# Patient Record
Sex: MALE | Race: WHITE | Employment: OTHER | URBAN - METROPOLITAN AREA
[De-identification: names, ages, dates, MRNs, and addresses within clinical notes are randomized per-mention and may not be internally consistent; named-entity substitution may affect disease eponyms.]

---

## 2018-05-09 RX ORDER — AMLODIPINE BESYLATE 2.5 MG/1
2.5 TABLET ORAL DAILY
COMMUNITY

## 2018-05-09 RX ORDER — KETOTIFEN FUMARATE 0.35 MG/ML
1 SOLUTION/ DROPS OPHTHALMIC AS NEEDED
COMMUNITY

## 2018-05-09 RX ORDER — OMEPRAZOLE 40 MG/1
40 CAPSULE, DELAYED RELEASE ORAL DAILY
COMMUNITY

## 2018-05-09 RX ORDER — ATORVASTATIN CALCIUM 40 MG/1
40 TABLET, FILM COATED ORAL DAILY
COMMUNITY
End: 2020-12-28

## 2018-05-11 ENCOUNTER — ANESTHESIA EVENT (OUTPATIENT)
Dept: ENDOSCOPY | Age: 68
End: 2018-05-11
Payer: MEDICARE

## 2018-05-11 ENCOUNTER — HOSPITAL ENCOUNTER (OUTPATIENT)
Age: 68
Setting detail: OUTPATIENT SURGERY
Discharge: HOME | End: 2018-05-11
Attending: SPECIALIST | Admitting: SPECIALIST
Payer: MEDICARE

## 2018-05-11 ENCOUNTER — ANESTHESIA (OUTPATIENT)
Dept: ENDOSCOPY | Age: 68
End: 2018-05-11
Payer: MEDICARE

## 2018-05-11 VITALS
OXYGEN SATURATION: 94 % | TEMPERATURE: 97.9 F | HEART RATE: 63 BPM | HEIGHT: 64 IN | BODY MASS INDEX: 30.92 KG/M2 | DIASTOLIC BLOOD PRESSURE: 67 MMHG | SYSTOLIC BLOOD PRESSURE: 123 MMHG | WEIGHT: 181.13 LBS | RESPIRATION RATE: 15 BRPM

## 2018-05-11 PROCEDURE — 76060000032 HC ANESTHESIA 0.5 TO 1 HR: Performed by: SPECIALIST

## 2018-05-11 PROCEDURE — 74011250636 HC RX REV CODE- 250/636

## 2018-05-11 PROCEDURE — 88305 TISSUE EXAM BY PATHOLOGIST: CPT | Performed by: SPECIALIST

## 2018-05-11 PROCEDURE — 74011250636 HC RX REV CODE- 250/636: Performed by: SPECIALIST

## 2018-05-11 PROCEDURE — 76040000007: Performed by: SPECIALIST

## 2018-05-11 PROCEDURE — 74011000250 HC RX REV CODE- 250

## 2018-05-11 RX ORDER — DEXTROMETHORPHAN/PSEUDOEPHED 2.5-7.5/.8
1.2 DROPS ORAL
Status: DISCONTINUED | OUTPATIENT
Start: 2018-05-11 | End: 2018-05-11 | Stop reason: HOSPADM

## 2018-05-11 RX ORDER — EPINEPHRINE 0.1 MG/ML
1 INJECTION INTRACARDIAC; INTRAVENOUS
Status: DISCONTINUED | OUTPATIENT
Start: 2018-05-11 | End: 2018-05-11 | Stop reason: HOSPADM

## 2018-05-11 RX ORDER — PROPOFOL 10 MG/ML
INJECTION, EMULSION INTRAVENOUS AS NEEDED
Status: DISCONTINUED | OUTPATIENT
Start: 2018-05-11 | End: 2018-05-11 | Stop reason: HOSPADM

## 2018-05-11 RX ORDER — FLUMAZENIL 0.1 MG/ML
0.2 INJECTION INTRAVENOUS
Status: DISCONTINUED | OUTPATIENT
Start: 2018-05-11 | End: 2018-05-11 | Stop reason: HOSPADM

## 2018-05-11 RX ORDER — GLYCOPYRROLATE 0.2 MG/ML
INJECTION INTRAMUSCULAR; INTRAVENOUS AS NEEDED
Status: DISCONTINUED | OUTPATIENT
Start: 2018-05-11 | End: 2018-05-11 | Stop reason: HOSPADM

## 2018-05-11 RX ORDER — NALOXONE HYDROCHLORIDE 0.4 MG/ML
0.4 INJECTION, SOLUTION INTRAMUSCULAR; INTRAVENOUS; SUBCUTANEOUS
Status: DISCONTINUED | OUTPATIENT
Start: 2018-05-11 | End: 2018-05-11 | Stop reason: HOSPADM

## 2018-05-11 RX ORDER — LIDOCAINE HYDROCHLORIDE 20 MG/ML
INJECTION, SOLUTION EPIDURAL; INFILTRATION; INTRACAUDAL; PERINEURAL AS NEEDED
Status: DISCONTINUED | OUTPATIENT
Start: 2018-05-11 | End: 2018-05-11 | Stop reason: HOSPADM

## 2018-05-11 RX ORDER — SODIUM CHLORIDE 9 MG/ML
50 INJECTION, SOLUTION INTRAVENOUS CONTINUOUS
Status: DISCONTINUED | OUTPATIENT
Start: 2018-05-11 | End: 2018-05-11 | Stop reason: HOSPADM

## 2018-05-11 RX ORDER — SODIUM CHLORIDE 0.9 % (FLUSH) 0.9 %
5-10 SYRINGE (ML) INJECTION EVERY 8 HOURS
Status: DISCONTINUED | OUTPATIENT
Start: 2018-05-11 | End: 2018-05-11 | Stop reason: HOSPADM

## 2018-05-11 RX ORDER — MIDAZOLAM HYDROCHLORIDE 1 MG/ML
1-3 INJECTION, SOLUTION INTRAMUSCULAR; INTRAVENOUS
Status: DISCONTINUED | OUTPATIENT
Start: 2018-05-11 | End: 2018-05-11 | Stop reason: HOSPADM

## 2018-05-11 RX ORDER — SODIUM CHLORIDE 0.9 % (FLUSH) 0.9 %
5-10 SYRINGE (ML) INJECTION AS NEEDED
Status: DISCONTINUED | OUTPATIENT
Start: 2018-05-11 | End: 2018-05-11 | Stop reason: HOSPADM

## 2018-05-11 RX ORDER — ATROPINE SULFATE 0.1 MG/ML
0.5 INJECTION INTRAVENOUS
Status: DISCONTINUED | OUTPATIENT
Start: 2018-05-11 | End: 2018-05-11 | Stop reason: HOSPADM

## 2018-05-11 RX ORDER — LISINOPRIL 10 MG/1
10 TABLET ORAL DAILY
COMMUNITY

## 2018-05-11 RX ORDER — PHENYLEPHRINE HCL IN 0.9% NACL 0.4MG/10ML
SYRINGE (ML) INTRAVENOUS AS NEEDED
Status: DISCONTINUED | OUTPATIENT
Start: 2018-05-11 | End: 2018-05-11 | Stop reason: HOSPADM

## 2018-05-11 RX ADMIN — SODIUM CHLORIDE 50 ML/HR: 900 INJECTION, SOLUTION INTRAVENOUS at 09:02

## 2018-05-11 RX ADMIN — Medication 200 MCG: at 10:44

## 2018-05-11 RX ADMIN — PROPOFOL 20 MG: 10 INJECTION, EMULSION INTRAVENOUS at 10:45

## 2018-05-11 RX ADMIN — Medication 200 MCG: at 10:29

## 2018-05-11 RX ADMIN — LIDOCAINE HYDROCHLORIDE 50 MG: 20 INJECTION, SOLUTION EPIDURAL; INFILTRATION; INTRACAUDAL; PERINEURAL at 10:24

## 2018-05-11 RX ADMIN — PROPOFOL 50 MG: 10 INJECTION, EMULSION INTRAVENOUS at 10:31

## 2018-05-11 RX ADMIN — PROPOFOL 100 MG: 10 INJECTION, EMULSION INTRAVENOUS at 10:24

## 2018-05-11 RX ADMIN — PROPOFOL 50 MG: 10 INJECTION, EMULSION INTRAVENOUS at 10:40

## 2018-05-11 RX ADMIN — PROPOFOL 50 MG: 10 INJECTION, EMULSION INTRAVENOUS at 10:35

## 2018-05-11 RX ADMIN — Medication 200 MCG: at 10:40

## 2018-05-11 RX ADMIN — GLYCOPYRROLATE 0.2 MG: 0.2 INJECTION INTRAMUSCULAR; INTRAVENOUS at 10:46

## 2018-05-11 RX ADMIN — PROPOFOL 50 MG: 10 INJECTION, EMULSION INTRAVENOUS at 10:27

## 2018-05-11 NOTE — H&P
Gastroenterology Outpatient History and Physical    Patient: Baljit Millanike    Physician: June Botello MD    Vital Signs: Blood pressure 132/79, pulse 60, temperature 97.9 °F (36.6 °C), resp. rate 23, height 5' 4\" (1.626 m), weight 82.2 kg (181 lb 2 oz), SpO2 99 %. Allergies: Allergies   Allergen Reactions    Codeine Unknown (comments)       Chief Complaint: H/O polyps    History of Present Illness: 77 yo WM for repeat colonoscopy for h/o polyp. Justification for Procedure: above    History:  Past Medical History:   Diagnosis Date    GERD (gastroesophageal reflux disease)     Hypertension     Ill-defined condition 2018    factor V     Stroke [facility])       Past Surgical History:   Procedure Laterality Date    APPENDECTOMY      HX MOHS PROCEDURES      left elbow,right knee,torn ligaments    HX OTHER SURGICAL      colonoscopy/polyp hx      Social History     Social History    Marital status:      Spouse name: N/A    Number of children: N/A    Years of education: N/A     Social History Main Topics    Smoking status: Former Smoker    Smokeless tobacco: Never Used    Alcohol use 0.6 oz/week     1 Cans of beer per week      Comment: twice a week    Drug use: None    Sexual activity: Not Asked     Other Topics Concern    None     Social History Narrative    History reviewed. No pertinent family history. Medications:   Prior to Admission medications    Medication Sig Start Date End Date Taking? Authorizing Provider   lisinopril (PRINIVIL, ZESTRIL) 10 mg tablet Take 10 mg by mouth daily. Yes Historical Provider   amLODIPine (NORVASC) 2.5 mg tablet Take 2.5 mg by mouth daily. Yes Historical Provider   atorvastatin (LIPITOR) 40 mg tablet Take 40 mg by mouth daily. Yes Historical Provider   omeprazole (PRILOSEC) 40 mg capsule Take 40 mg by mouth daily. Yes Historical Provider   Cetirizine (ZYRTEC) 10 mg cap Take  by mouth daily.    Yes Historical Provider   eye lubricant combination no.1 2-0.9-1.8 % drop Apply  to eye. Yes Historical Provider   ketotifen (ZADITOR) 0.025 % (0.035 %) ophthalmic solution Administer 1 Drop to both eyes as needed. Yes Historical Provider   aspirin 81 mg tablet Take 81 mg by mouth. Yes Phys Other, MD       Physical Exam:   General: alert, no distress   HEENT: Head: Normocephalic, no lesions, without obvious abnormality.    Heart: regular rate and rhythm, S1, S2 normal, no murmur, click, rub or gallop   Lungs: chest clear, no wheezing, rales, normal symmetric air entry   Abdominal: soft, nontender, nondistended, + BS   Neurological: Grossly normal   Extremities: extremities normal, atraumatic, no cyanosis or edema     Findings/Diagnosis: H/O Polyps    Plan of Care/Planned Procedure: Colonoscopy    Signed By: Royer Oswald MD     May 11, 2018

## 2018-05-11 NOTE — PERIOP NOTES
Anesthesia reports 320mg Propofol, 50mg Lidocaine 0.2mg Robinul 600mg Phenylephrine and 800mL NS given during procedure. Received report from anesthesia staff on vital signs and status of patient.

## 2018-05-11 NOTE — ANESTHESIA PREPROCEDURE EVALUATION
Anesthetic History   No history of anesthetic complications            Review of Systems / Medical History  Patient summary reviewed, nursing notes reviewed and pertinent labs reviewed    Pulmonary                Comments: Former smoker   Neuro/Psych       CVA: no residual symptoms  TIA     Cardiovascular    Hypertension: well controlled              Exercise tolerance: >4 METS     GI/Hepatic/Renal     GERD: well controlled          Comments: H/O Colon Polyps Endo/Other  Within defined limits           Other Findings            Physical Exam    Airway  Mallampati: II  TM Distance: > 6 cm  Neck ROM: normal range of motion   Mouth opening: Normal     Cardiovascular  Regular rate and rhythm,  S1 and S2 normal,  no murmur, click, rub, or gallop             Dental      Comments: Several missing teeth, none loose.    Pulmonary  Breath sounds clear to auscultation               Abdominal  GI exam deferred       Other Findings            Anesthetic Plan    ASA: 3  Anesthesia type: general and total IV anesthesia          Induction: Intravenous  Anesthetic plan and risks discussed with: Patient

## 2018-05-11 NOTE — DISCHARGE INSTRUCTIONS
Alvina Gonzalez  [id number]  1950    COLON DISCHARGE INSTRUCTIONS  Discomfort:  Redness at IV site- apply warm compress to area; if redness or soreness persist- contact your physician  There may be a slight amount of blood passed from the rectum  Gaseous discomfort- walking, belching will help relieve any discomfort  You may not operate a vehicle for 12 hours  You may not engage in an occupation involving machinery or appliances for rest of today  You may not drink alcoholic beverages for at least 12 hours  Avoid making any critical decisions for at least 24 hour  DIET:   Regular diet. - however -  remember your colon is empty and a heavy meal will produce gas. Avoid these foods:  vegetables, fried / greasy foods, carbonated drinks for today. MEDICATIONS:        Regarding Aspirin or Nonsteroidal medications, please see below. ACTIVITY:  You may resume your normal daily activities it is recommended that you spend the remainder of the day resting -  avoid any strenuous activity. CALL M.D. ANY SIGN OF:  Increasing pain, nausea, vomiting  Abdominal distension (swelling)  New increased bleeding (oral or rectal)  Fever (chills)    ONLY  Tylenol as needed for pain. Follow-up Instructions:   Call Dr. Regina Riley for results of procedure / biopsy in 4-5 days at telephone #  [phone]              Repeat Colonoscopy in 3 years. Skillshare Activation    Thank you for requesting access to Skillshare. Please follow the instructions below to securely access and download your online medical record. Skillshare allows you to send messages to your doctor, view your test results, renew your prescriptions, schedule appointments, and more. How Do I Sign Up? 1. In your internet browser, go to [email or web address]  2. Click on the First Time User? Click Here link in the Sign In box. You will be redirect to the New Member Sign Up page. 3. Enter your Skillshare Access Code exactly as it appears below.  You will not need to use this code after youve completed the sign-up process. If you do not sign up before the expiration date, you must request a new code. ActionPlanner Access Code: [email or web address]  Expires: 2018  8:20 AM (This is the date your ActionPlanner access code will )    4. Enter the last four digits of your Social Security Number (xxxx) and Date of Birth (mm/dd/yyyy) as indicated and click Submit. You will be taken to the next sign-up page. 5. Create a ActionPlanner ID. This will be your ActionPlanner login ID and cannot be changed, so think of one that is secure and easy to remember. 6. Create a ActionPlanner password. You can change your password at any time. 7. Enter your Password Reset Question and Answer. This can be used at a later time if you forget your password. 8. Enter your e-mail address. You will receive e-mail notification when new information is available in [address] 9. Click Sign Up. You can now view and download portions of your medical record. 10. Click the Download Summary menu link to download a portable copy of your medical information. Additional Information    If you have questions, please visit the Frequently Asked Questions section of the ActionPlanner website at [email or web address] Remember, ActionPlanner is NOT to be used for urgent needs. For medical emergencies, dial 911.

## 2018-05-11 NOTE — IP AVS SNAPSHOT
[address] [facility] 
[phone] Patient: Maci Florence MRN: [id number] XDD:[id number] About your hospitalization You were admitted on:  May 11, 2018 You last received care in the:  [facility] ENDOSCOPY You were discharged on:  May 11, 2018 Why you were hospitalized Your primary diagnosis was:  Not on File Follow-up Information None Discharge Orders None A check elena indicates which time of day the medication should be taken. My Medications CONTINUE taking these medications Instructions Each Dose to Equal  
 Morning Noon Evening Bedtime  
 amLODIPine 2.5 mg tablet Commonly known as:  Skibradley Barraza Your last dose was: Your next dose is: Take 2.5 mg by mouth daily. 2.5 mg  
    
   
   
   
  
 aspirin 81 mg tablet Your last dose was: Your next dose is: Take 81 mg by mouth. 81 mg  
    
   
   
   
  
 atorvastatin 40 mg tablet Commonly known as:  LIPITOR Your last dose was: Your next dose is: Take 40 mg by mouth daily. 40 mg  
    
   
   
   
  
 eye lubricant combination no.1 2-0.9-1.8 % Drop Your last dose was: Your next dose is:    
   
   
 Apply  to eye.  
     
   
   
   
  
 ketotifen 0.025 % (0.035 %) ophthalmic solution Commonly known as:  ZADITOR Your last dose was: Your next dose is:    
   
   
 Administer 1 Drop to both eyes as needed. 1 Drop  
    
   
   
   
  
 lisinopril 10 mg tablet Commonly known as:  Dylan [place] Your last dose was: Your next dose is: Take 10 mg by mouth daily. 10 mg  
    
   
   
   
  
 omeprazole 40 mg capsule Commonly known as:  PRILOSEC Your last dose was: Your next dose is: Take 40 mg by mouth daily. 40 mg  
    
   
   
   
  
 ZyrTEC 10 mg Cap Generic drug:  Cetirizine Your last dose was: Your next dose is: Take  by mouth daily. Discharge Instructions Victorina Arlette 
[id number] 
1950 COLON DISCHARGE INSTRUCTIONS Discomfort: 
Redness at IV site- apply warm compress to area; if redness or soreness persist- contact your physician There may be a slight amount of blood passed from the rectum Gaseous discomfort- walking, belching will help relieve any discomfort You may not operate a vehicle for 12 hours You may not engage in an occupation involving machinery or appliances for rest of today You may not drink alcoholic beverages for at least 12 hours Avoid making any critical decisions for at least 24 hour DIET: 
 Regular diet.  however -  remember your colon is empty and a heavy meal will produce gas. Avoid these foods:  vegetables, fried / greasy foods, carbonated drinks for today. MEDICATIONS: 
  
 
 Regarding Aspirin or Nonsteroidal medications, please see below. ACTIVITY: 
You may resume your normal daily activities it is recommended that you spend the remainder of the day resting -  avoid any strenuous activity. CALL M.D. ANY SIGN OF: Increasing pain, nausea, vomiting Abdominal distension (swelling) New increased bleeding (oral or rectal) Fever (chills) ONLY  Tylenol as needed for pain. Follow-up Instructions: 
 Call Dr. Shiv Loya for results of procedure / biopsy in 4-5 days at telephone #  [phone] Repeat Colonoscopy in 3 years. Bunkspeed Activation Thank you for requesting access to Bunkspeed. Please follow the instructions below to securely access and download your online medical record. Bunkspeed allows you to send messages to your doctor, view your test results, renew your prescriptions, schedule appointments, and more. How Do I Sign Up? 1. In your internet browser, go to [email or web address] 
 2. Click on the First Time User? Click Here link in the Sign In box. You will be redirect to the New Member Sign Up page. 3. Enter your ContractRoom Access Code exactly as it appears below. You will not need to use this code after youve completed the sign-up process. If you do not sign up before the expiration date, you must request a new code. ContractRoom Access Code: [email or web address] Expires: 2018  8:20 AM (This is the date your ContractRoom access code will ) 4. Enter the last four digits of your Social Security Number (xxxx) and Date of Birth (mm/dd/yyyy) as indicated and click Submit. You will be taken to the next sign-up page. 5. Create a Quotify Technologyt ID. This will be your ContractRoom login ID and cannot be changed, so think of one that is secure and easy to remember. 6. Create a ContractRoom password. You can change your password at any time. 7. Enter your Password Reset Question and Answer. This can be used at a later time if you forget your password. 8. Enter your e-mail address. You will receive e-mail notification when new information is available in [address] 9. Click Sign Up. You can now view and download portions of your medical record. 10. Click the Download Summary menu link to download a portable copy of your medical information. Additional Information If you have questions, please visit the Frequently Asked Questions section of the ContractRoom website at [email or web address] Remember, ContractRoom is NOT to be used for urgent needs. For medical emergencies, dial 911. Introducing [facility]! Grace Alvarado introduces ContractRoom patient portal. Now you can access parts of your medical record, email your doctor's office, and request medication refills online. 1. In your internet browser, go to [email or web address] 2. Click on the First Time User? Click Here link in the Sign In box. You will see the New Member Sign Up page. 3. Enter your The America's Card Access Code exactly as it appears below. You will not need to use this code after youve completed the sign-up process. If you do not sign up before the expiration date, you must request a new code. · The America's Card Access Code: [email or web address] Expires: 8/9/2018  8:20 AM 
 
4. Enter the last four digits of your Social Security Number (xxxx) and Date of Birth (mm/dd/yyyy) as indicated and click Submit. You will be taken to the next sign-up page. 5. Create a ioSemanticst ID. This will be your The America's Card login ID and cannot be changed, so think of one that is secure and easy to remember. 6. Create a The America's Card password. You can change your password at any time. 7. Enter your Password Reset Question and Answer. This can be used at a later time if you forget your password. 8. Enter your e-mail address. You will receive e-mail notification when new information is available in [address] 9. Click Sign Up. You can now view and download portions of your medical record. 10. Click the Download Summary menu link to download a portable copy of your medical information. If you have questions, please visit the Frequently Asked Questions section of the The America's Card website. Remember, The America's Card is NOT to be used for urgent needs. For medical emergencies, dial 911. Now available from your iPhone and Android! Introducing Quirino Hinojosa As a [facility] patient, I wanted to make you aware of our electronic visit tool called Quirino Jassibuddyluis. [facility] 24/7 allows you to connect within minutes with a medical provider 24 hours a day, seven days a week via a mobile device or tablet or logging into a secure website from your computer. You can access Quirino Jassibuddyfin from anywhere in the [place].  
 
A virtual visit might be right for you when you have a simple condition and feel like you just dont want to get out of bed, or cant get away from work for an appointment, when your regular [facility] provider is not available (evenings, weekends or holidays), or when youre out of town and need minor care. Electronic visits cost only $49 and if the [facility] 24/7 provider determines a prescription is needed to treat your condition, one can be electronically transmitted to a nearby pharmacy*. Please take a moment to enroll today if you have not already done so. The enrollment process is free and takes just a few minutes. To enroll, please download the WorkHound/[employer] mone to your tablet or phone, or visit [email or web address] to enroll on your computer. And, as an [address] [place] [address] patient with a quickhuddle account, the results of your visits will be scanned into your electronic medical record and your primary care provider will be able to view the scanned results. We urge you to continue to see your regular [facility] provider for your ongoing medical care. And while your primary care provider may not be the one available when you seek a [employer] virtual visit, the peace of mind you get from getting a real diagnosis real time can be priceless. For more information on [employer]me, view our Frequently Asked Questions (FAQs) at [email or web address]. Sincerely, 
 
Ermelinda Smith MD 
Chief Medical Officer [employer] *:  certain medications cannot be prescribed via [employer] Providers Seen During Your Hospitalization Provider Specialty Primary office phone Allyssa Banks MD Gastroenterology [phone] Your Primary Care Physician (PCP) Primary Care Physician Office Phone Office Fax Jetty Belt [phone] You are allergic to the following Allergen Reactions Codeine Unknown (comments) Recent Documentation Height Weight BMI Smoking Status 1.626 m 82.2 kg 31.09 kg/m2 Former Smoker Emergency Contacts Name Discharge Info Relation Home Work Mobile [address] CAREGIVER [3] Child [2] [phone] Patient Belongings The following personal items are in your possession at time of discharge: 
  Dental Appliances: None  Visual Aid: Glasses, With patient Please provide this summary of care documentation to your next provider. Signatures-by signing, you are acknowledging that this After Visit Summary has been reviewed with you and you have received a copy. Patient Signature:  ____________________________________________________________ Date:  ____________________________________________________________  
  
[facility] Provider Signature:  ____________________________________________________________ Date:  ____________________________________________________________

## 2018-05-11 NOTE — PROCEDURES
Colonoscopy Procedure Note    Indications:   Personal history of colon polyps (screening only)    Referring Physician: Hernandez Mosher NP  Anesthesia/Sedation: MAC anesthesia Propofol  Endoscopist:  Dr. Jagruti Kenny    Procedure in Detail:  Informed consent was obtained for the procedure, including sedation. Risks of perforation, hemorrhage, adverse drug reaction, and aspiration were discussed. The patient was placed in the left lateral decubitus position. Based on the pre-procedure assessment, including review of the patient's medical history, medications, allergies, and review of systems, he had been deemed to be an appropriate candidate for moderate sedation; he was therefore sedated with the medications listed above. The patient was monitored continuously with ECG tracing, pulse oximetry, blood pressure monitoring, and direct observations. A rectal examination was performed. The [id number] was inserted into the rectum and advanced under direct vision to the cecum, which was identified by the ileocecal valve and appendiceal orifice. The quality of the colonic preparation was adequate. A careful inspection was made as the colonoscope was withdrawn, including a retroflexed view of the rectum; findings and interventions are described below. Appropriate photodocumentation was obtained. Findings:     1. Scope advanced to the cecum. 2.  Preparation was adequate. 3.  There were multiple sessile polyps removed as below:   (1) 7 mm in transverse colon s/p hot snare; (1) small 4 mm s/p cold snare   (3) 5 mm descending colon polyps s/p cold snare   (1) 5 mm sigmoid colon polyp s/p cold snare    Therapies:  See above    Specimen:  Specimens were collected as described above and sent to pathology. Complications: None were encountered during the procedure. EBL: < 10 ml. Recommendations:     - If adenoma is present, repeat colonoscopy in 3 years.     Signed By: Charito Rosas MD May 11, 2018

## 2018-05-11 NOTE — ROUTINE PROCESS
Alvina Carlos  1950  [id number]    Situation:  Verbal report received from: Bairon Baugh RN  Procedure: Procedure(s):  COLONOSCOPY  ENDOSCOPIC POLYPECTOMY    Background:    Preoperative diagnosis: PERSONAL HISTORY COLONIC POLYPS  Postoperative diagnosis: Colon polyp,    :  Dr. Regina Riley  Assistant(s): Endoscopy Technician-1: Pina Greenwood  Endoscopy RN-1: Kevin Rosario RN    Specimens:   ID Type Source Tests Collected by Time Destination   1 : Transverse colon polyp Preservative   Jada Fish MD 5/11/2018 1041 Pathology   2 : Descending colon polyp Preservative   Jada Fish MD 5/11/2018 1046 Pathology   3 : Sigmoid colon polyp Preservative   Jada Fish MD 5/11/2018 1050 Pathology     H. Pylori  no    Assessment:  Intra-procedure medications   Anesthesia gave intra-procedure sedation and medications, see anesthesia flow sheet yes    Intravenous fluids: NS@ KVO     Vital signs stable     Abdominal assessment: round and soft     Recommendation:  Discharge patient per MD order.   Family or Friend   Permission to share finding with family or friend yes

## 2018-05-11 NOTE — ANESTHESIA POSTPROCEDURE EVALUATION
Post-Anesthesia Evaluation and Assessment    Patient: Katrina Lefort MRN: [id number]  SSN: [id number]    YOB: 1950  Age: 78 y.o. Sex: male       Cardiovascular Function/Vital Signs  Visit Vitals    /63    Pulse 62    Temp 36.6 °C (97.9 °F)    Resp 16    Ht 5' 4\" (1.626 m)    Wt 82.2 kg (181 lb 2 oz)    SpO2 96%    BMI 31.09 kg/m2       Patient is status post general, total IV anesthesia anesthesia for Procedure(s):  COLONOSCOPY  ENDOSCOPIC POLYPECTOMY. Nausea/Vomiting: None    Postoperative hydration reviewed and adequate. Pain:  Pain Scale 1: Numeric (0 - 10) (05/11/18 1105)  Pain Intensity 1: 0 (05/11/18 1105)   Managed    Neurological Status: At baseline    Mental Status and Level of Consciousness: Arousable    Pulmonary Status:   O2 Device: Room air (05/11/18 1105)   Adequate oxygenation and airway patent    Complications related to anesthesia: None    Post-anesthesia assessment completed.  No concerns    Signed By: Urmila Neal MD     May 11, 2018

## 2020-09-08 ENCOUNTER — HOSPITAL ENCOUNTER (OUTPATIENT)
Dept: CT IMAGING | Age: 70
Discharge: HOME | End: 2020-09-08
Attending: FAMILY MEDICINE
Payer: MEDICARE

## 2020-09-08 DIAGNOSIS — R63.4 LOSS OF WEIGHT: ICD-10-CM

## 2020-09-08 LAB — CREAT BLD-MCNC: 1 MG/DL (ref 0.6–1.3)

## 2020-09-08 PROCEDURE — 74011000636 HC RX REV CODE- 636: Performed by: RADIOLOGY

## 2020-09-08 PROCEDURE — 74177 CT ABD & PELVIS W/CONTRAST: CPT

## 2020-09-08 PROCEDURE — 82565 ASSAY OF CREATININE: CPT

## 2020-09-08 RX ORDER — BARIUM SULFATE 20 MG/ML
900 SUSPENSION ORAL
Status: DISCONTINUED | OUTPATIENT
Start: 2020-09-08 | End: 2020-09-08

## 2020-09-08 RX ORDER — SODIUM CHLORIDE 0.9 % (FLUSH) 0.9 %
10 SYRINGE (ML) INJECTION
Status: COMPLETED | OUTPATIENT
Start: 2020-09-08 | End: 2020-09-08

## 2020-09-08 RX ADMIN — IOHEXOL 50 ML: 240 INJECTION, SOLUTION INTRATHECAL; INTRAVASCULAR; INTRAVENOUS; ORAL at 06:57

## 2020-09-08 RX ADMIN — Medication 10 ML: at 06:57

## 2020-09-08 RX ADMIN — IOPAMIDOL 100 ML: 755 INJECTION, SOLUTION INTRAVENOUS at 06:57

## 2020-09-30 ENCOUNTER — HOSPITAL ENCOUNTER (OUTPATIENT)
Dept: MRI IMAGING | Age: 70
Discharge: HOME | End: 2020-09-30
Attending: FAMILY MEDICINE
Payer: MEDICARE

## 2020-09-30 DIAGNOSIS — R27.0 ATAXIA: ICD-10-CM

## 2020-09-30 DIAGNOSIS — R25.1 TREMOR: ICD-10-CM

## 2020-09-30 DIAGNOSIS — R51.9 HEAD ACHE: ICD-10-CM

## 2020-09-30 PROCEDURE — 70551 MRI BRAIN STEM W/O DYE: CPT

## 2020-10-08 ENCOUNTER — HOSPITAL ENCOUNTER (OUTPATIENT)
Dept: PREADMISSION TESTING | Age: 70
Discharge: HOME | End: 2020-10-08
Payer: MEDICARE

## 2020-10-08 ENCOUNTER — TRANSCRIBE ORDER (OUTPATIENT)
Dept: REGISTRATION | Age: 70
End: 2020-10-08

## 2020-10-08 DIAGNOSIS — Z01.812 PRE-PROCEDURE LAB EXAM: ICD-10-CM

## 2020-10-08 DIAGNOSIS — Z01.812 PRE-PROCEDURE LAB EXAM: Primary | ICD-10-CM

## 2020-10-08 PROCEDURE — 87635 SARS-COV-2 COVID-19 AMP PRB: CPT

## 2020-10-09 LAB — SARS-COV-2, COV2NT: NOT DETECTED

## 2020-10-12 ENCOUNTER — HOSPITAL ENCOUNTER (OUTPATIENT)
Age: 70
Setting detail: OUTPATIENT SURGERY
Discharge: HOME | End: 2020-10-12
Attending: INTERNAL MEDICINE | Admitting: INTERNAL MEDICINE
Payer: MEDICARE

## 2020-10-12 ENCOUNTER — ANESTHESIA EVENT (OUTPATIENT)
Dept: ENDOSCOPY | Age: 70
End: 2020-10-12
Payer: MEDICARE

## 2020-10-12 ENCOUNTER — ANESTHESIA (OUTPATIENT)
Dept: ENDOSCOPY | Age: 70
End: 2020-10-12
Payer: MEDICARE

## 2020-10-12 VITALS
RESPIRATION RATE: 14 BRPM | DIASTOLIC BLOOD PRESSURE: 79 MMHG | HEIGHT: 64 IN | TEMPERATURE: 98.7 F | BODY MASS INDEX: 24.41 KG/M2 | SYSTOLIC BLOOD PRESSURE: 114 MMHG | HEART RATE: 72 BPM | OXYGEN SATURATION: 97 % | WEIGHT: 143 LBS

## 2020-10-12 PROCEDURE — 74011250636 HC RX REV CODE- 250/636: Performed by: INTERNAL MEDICINE

## 2020-10-12 PROCEDURE — 77030010936 HC CLP LIG BSC -C: Performed by: INTERNAL MEDICINE

## 2020-10-12 PROCEDURE — 74011250636 HC RX REV CODE- 250/636: Performed by: NURSE ANESTHETIST, CERTIFIED REGISTERED

## 2020-10-12 PROCEDURE — 74011250637 HC RX REV CODE- 250/637: Performed by: INTERNAL MEDICINE

## 2020-10-12 PROCEDURE — 88305 TISSUE EXAM BY PATHOLOGIST: CPT

## 2020-10-12 PROCEDURE — 77030021593 HC FCPS BIOP ENDOSC BSC -A: Performed by: INTERNAL MEDICINE

## 2020-10-12 PROCEDURE — 77030013992 HC SNR POLYP ENDOSC BSC -B: Performed by: INTERNAL MEDICINE

## 2020-10-12 PROCEDURE — 77030040684 HC NDL ENDOSC NEEDLEMASTR OCOA -B: Performed by: INTERNAL MEDICINE

## 2020-10-12 PROCEDURE — 76040000008: Performed by: INTERNAL MEDICINE

## 2020-10-12 PROCEDURE — 2709999900 HC NON-CHARGEABLE SUPPLY: Performed by: INTERNAL MEDICINE

## 2020-10-12 PROCEDURE — 77030040362: Performed by: INTERNAL MEDICINE

## 2020-10-12 PROCEDURE — 74011000250 HC RX REV CODE- 250: Performed by: NURSE ANESTHETIST, CERTIFIED REGISTERED

## 2020-10-12 PROCEDURE — 76060000033 HC ANESTHESIA 1 TO 1.5 HR: Performed by: INTERNAL MEDICINE

## 2020-10-12 RX ORDER — SODIUM CHLORIDE 9 MG/ML
INJECTION, SOLUTION INTRAVENOUS
Status: DISCONTINUED | OUTPATIENT
Start: 2020-10-12 | End: 2020-10-12 | Stop reason: HOSPADM

## 2020-10-12 RX ORDER — DEXTROMETHORPHAN/PSEUDOEPHED 2.5-7.5/.8
1.2 DROPS ORAL
Status: DISCONTINUED | OUTPATIENT
Start: 2020-10-12 | End: 2020-10-12 | Stop reason: HOSPADM

## 2020-10-12 RX ORDER — NALOXONE HYDROCHLORIDE 0.4 MG/ML
0.4 INJECTION, SOLUTION INTRAMUSCULAR; INTRAVENOUS; SUBCUTANEOUS
Status: DISCONTINUED | OUTPATIENT
Start: 2020-10-12 | End: 2020-10-12 | Stop reason: HOSPADM

## 2020-10-12 RX ORDER — LIDOCAINE HYDROCHLORIDE 20 MG/ML
INJECTION, SOLUTION EPIDURAL; INFILTRATION; INTRACAUDAL; PERINEURAL AS NEEDED
Status: DISCONTINUED | OUTPATIENT
Start: 2020-10-12 | End: 2020-10-12 | Stop reason: HOSPADM

## 2020-10-12 RX ORDER — FENTANYL CITRATE 50 UG/ML
100 INJECTION, SOLUTION INTRAMUSCULAR; INTRAVENOUS
Status: DISCONTINUED | OUTPATIENT
Start: 2020-10-12 | End: 2020-10-12 | Stop reason: HOSPADM

## 2020-10-12 RX ORDER — FLUMAZENIL 0.1 MG/ML
0.2 INJECTION INTRAVENOUS
Status: DISCONTINUED | OUTPATIENT
Start: 2020-10-12 | End: 2020-10-12 | Stop reason: HOSPADM

## 2020-10-12 RX ORDER — PROPOFOL 10 MG/ML
INJECTION, EMULSION INTRAVENOUS AS NEEDED
Status: DISCONTINUED | OUTPATIENT
Start: 2020-10-12 | End: 2020-10-12 | Stop reason: HOSPADM

## 2020-10-12 RX ORDER — MIDAZOLAM HYDROCHLORIDE 1 MG/ML
.25-1 INJECTION, SOLUTION INTRAMUSCULAR; INTRAVENOUS
Status: DISCONTINUED | OUTPATIENT
Start: 2020-10-12 | End: 2020-10-12 | Stop reason: HOSPADM

## 2020-10-12 RX ORDER — SODIUM CHLORIDE 9 MG/ML
50 INJECTION, SOLUTION INTRAVENOUS CONTINUOUS
Status: DISCONTINUED | OUTPATIENT
Start: 2020-10-12 | End: 2020-10-12 | Stop reason: HOSPADM

## 2020-10-12 RX ORDER — EPINEPHRINE 0.1 MG/ML
1 INJECTION INTRACARDIAC; INTRAVENOUS
Status: DISCONTINUED | OUTPATIENT
Start: 2020-10-12 | End: 2020-10-12 | Stop reason: HOSPADM

## 2020-10-12 RX ORDER — SODIUM CHLORIDE 0.9 % (FLUSH) 0.9 %
5-40 SYRINGE (ML) INJECTION EVERY 8 HOURS
Status: DISCONTINUED | OUTPATIENT
Start: 2020-10-12 | End: 2020-10-12 | Stop reason: HOSPADM

## 2020-10-12 RX ORDER — SODIUM CHLORIDE 0.9 % (FLUSH) 0.9 %
5-40 SYRINGE (ML) INJECTION AS NEEDED
Status: DISCONTINUED | OUTPATIENT
Start: 2020-10-12 | End: 2020-10-12 | Stop reason: HOSPADM

## 2020-10-12 RX ORDER — ATROPINE SULFATE 0.1 MG/ML
0.5 INJECTION INTRAVENOUS
Status: DISCONTINUED | OUTPATIENT
Start: 2020-10-12 | End: 2020-10-12 | Stop reason: HOSPADM

## 2020-10-12 RX ADMIN — PROPOFOL 50 MG: 10 INJECTION, EMULSION INTRAVENOUS at 13:38

## 2020-10-12 RX ADMIN — PROPOFOL 50 MG: 10 INJECTION, EMULSION INTRAVENOUS at 13:42

## 2020-10-12 RX ADMIN — PROPOFOL 20 MG: 10 INJECTION, EMULSION INTRAVENOUS at 14:23

## 2020-10-12 RX ADMIN — PROPOFOL 20 MG: 10 INJECTION, EMULSION INTRAVENOUS at 14:07

## 2020-10-12 RX ADMIN — PROPOFOL 30 MG: 10 INJECTION, EMULSION INTRAVENOUS at 13:54

## 2020-10-12 RX ADMIN — PROPOFOL 50 MG: 10 INJECTION, EMULSION INTRAVENOUS at 13:37

## 2020-10-12 RX ADMIN — PROPOFOL 20 MG: 10 INJECTION, EMULSION INTRAVENOUS at 14:11

## 2020-10-12 RX ADMIN — PROPOFOL 20 MG: 10 INJECTION, EMULSION INTRAVENOUS at 14:15

## 2020-10-12 RX ADMIN — PROPOFOL 30 MG: 10 INJECTION, EMULSION INTRAVENOUS at 13:48

## 2020-10-12 RX ADMIN — PROPOFOL 30 MG: 10 INJECTION, EMULSION INTRAVENOUS at 14:19

## 2020-10-12 RX ADMIN — PROPOFOL 20 MG: 10 INJECTION, EMULSION INTRAVENOUS at 14:04

## 2020-10-12 RX ADMIN — PROPOFOL 50 MG: 10 INJECTION, EMULSION INTRAVENOUS at 13:40

## 2020-10-12 RX ADMIN — PROPOFOL 30 MG: 10 INJECTION, EMULSION INTRAVENOUS at 13:45

## 2020-10-12 RX ADMIN — LIDOCAINE HYDROCHLORIDE 100 MG: 20 INJECTION, SOLUTION EPIDURAL; INFILTRATION; INTRACAUDAL; PERINEURAL at 13:37

## 2020-10-12 RX ADMIN — PROPOFOL 20 MG: 10 INJECTION, EMULSION INTRAVENOUS at 14:01

## 2020-10-12 RX ADMIN — PROPOFOL 20 MG: 10 INJECTION, EMULSION INTRAVENOUS at 14:26

## 2020-10-12 RX ADMIN — SODIUM CHLORIDE: 900 INJECTION, SOLUTION INTRAVENOUS at 13:24

## 2020-10-12 RX ADMIN — PROPOFOL 30 MG: 10 INJECTION, EMULSION INTRAVENOUS at 13:51

## 2020-10-12 RX ADMIN — PROPOFOL 20 MG: 10 INJECTION, EMULSION INTRAVENOUS at 14:29

## 2020-10-12 RX ADMIN — PROPOFOL 30 MG: 10 INJECTION, EMULSION INTRAVENOUS at 13:58

## 2020-10-12 NOTE — PERIOP NOTES

## 2020-10-12 NOTE — ANESTHESIA PREPROCEDURE EVALUATION
Relevant Problems   No relevant active problems       Anesthetic History   No history of anesthetic complications            Review of Systems / Medical History  Patient summary reviewed, nursing notes reviewed and pertinent labs reviewed    Pulmonary  Within defined limits                 Neuro/Psych       CVA  TIA     Cardiovascular    Hypertension: well controlled              Exercise tolerance: >4 METS     GI/Hepatic/Renal     GERD           Endo/Other             Other Findings              Physical Exam    Airway  Mallampati: I  TM Distance: > 6 cm  Neck ROM: normal range of motion   Mouth opening: Normal     Cardiovascular    Rhythm: regular  Rate: normal         Dental  No notable dental hx       Pulmonary  Breath sounds clear to auscultation               Abdominal         Other Findings            Anesthetic Plan    ASA: 2  Anesthesia type: MAC          Induction: Intravenous  Anesthetic plan and risks discussed with: Patient

## 2020-10-12 NOTE — PROCEDURES
[address]  217 [facility] [address] , [address]  [phone]                           Colonoscopy and EGD Procedure Note      Indications:  Unintenional weight loss     :  Essie Jones MD    Referring Provider: Vicente Godinez MD    Sedation:  MAC anesthesia    Procedure Details:  After informed consent was obtained with all risks and benefits of procedure explained and pre-operative exam completed, pt was placed in the left lateral decubitus position. Following sequential administration of sedation as per above, the gastroscope was inserted into the mouth and advanced under direct vision to second portion of the duodenum. A careful inspection was made as the gastroscope was withdrawn, including a retroflexed view of the proximal stomach; findings and interventions are described below. EGD Findings:  Esophagus: irregular Z-line (would qualify as C0M2), biopsied. GE junction 38 cm from the incisors. Small (1-2 cm) sliding hiatal hernia  Stomach: small (5 mm) polyp in the cardia, biopsied, few small benign fundic gland polyps in body  Duodenum/jejunum:normal    EGD Interventions:  biopsies    The bed was then turned and upon sequential sedation as per above, a digital rectal exam was performed per below. The Olympus videocolonoscope was inserted in the rectum and carefully advanced to the terminal ileum. The quality of preparation was good. Franklin Bowel Prep Score 3/3/3. The colonoscope was slowly withdrawn with careful evaluation between folds. Retroflexion in the rectum was performed. Colon Findings:   Rectum: normal   Sigmoid: normal  Descending Colon: One 11 mm sessile polyp, removed with cold snare, one clip empirically placed  Transverse Colon: One 23 mm sessile polyp, lifted with 12 cc of orise, removed piecemeal  Ascending Colon: Two sessile polyps (3 mm), removed with forceps. 4 sessile polyps (4-8 mm), removed with cold snare.  One 12 mm flat polyp on back of IC valve, removed with cold snare and one clip empirically placed over polypectomy site  Cecum: One 4 mm sessile polyp, removed with cold snare  Terminal Ileum: normal    Colonoscopy Interventions:  polypectomy           Specimens Removed:    ID Type Source Tests Collected by Time Destination   1 : cardia bx Preservative Gastric  Alessia Daniel MD 10/12/2020 1340 Pathology   2 : GE junction bx Preservative Genital  Alessia Daniel MD 10/12/2020 1342 Pathology   3 : right sided colon polyps Preservative Colon, Right  Alessia Daniel MD 10/12/2020 1358 Pathology   4 : transverse colon polyp Preservative Colon, Transverse  Alessia Daniel MD 10/12/2020 1410 Pathology   5 : hepatic flexure Preservative Hepatic Flexure  Alessia Daniel MD 10/12/2020 1418 Pathology   6 : left sided colon polyps Preservative Colon, Left  Alessia Daniel MD 10/12/2020 1430 Pathology       Complications: None. EBL:  minimal    Impression:    See Postoperative diagnosis above    Recommendations:   - Await pathology. You should receive a letter within 2 weeks. - Resume normal medications.  - Recommend repeat colonoscopy in 6 months. - Avoid NSAIDs and aspirin for next 14 days. Discharge Disposition:  Home in the company of a  when able to ambulate.     Marian Dc MD  10/12/2020  2:33 PM

## 2020-10-12 NOTE — ANESTHESIA POSTPROCEDURE EVALUATION
Post-Anesthesia Evaluation and Assessment    Patient: Troy Hamilton MRN: [id number]  SSN: [id number]    YOB: 1950  Age: 72 y.o. Sex: male      I have evaluated the patient and they are stable and ready for discharge from the PACU. Cardiovascular Function/Vital Signs  Visit Vitals  /79   Pulse 72   Temp 37.1 °C (98.7 °F)   Resp 14   Ht 5' 4\" (1.626 m)   Wt 64.9 kg (143 lb)   SpO2 97%   BMI 24.55 kg/m²       Patient is status post MAC anesthesia for Procedure(s):  ESOPHAGOGASTRODUODENOSCOPY (EGD), COLONOSCOPY  COLONOSCOPY    :-  ESOPHAGOGASTRODUODENAL (EGD) BIOPSY  ENDOSCOPIC POLYPECTOMY  RESOLUTION CLIP  INJECTION. Nausea/Vomiting: None    Postoperative hydration reviewed and adequate. Pain:  Pain Scale 1: Numeric (0 - 10) (10/12/20 1440)  Pain Intensity 1: 0 (10/12/20 1440)   Managed    Neurological Status: At baseline    Mental Status, Level of Consciousness: Alert and  oriented to person, place, and time    Pulmonary Status:   O2 Device: Nasal cannula (10/12/20 1432)   Adequate oxygenation and airway patent    Complications related to anesthesia: None    Post-anesthesia assessment completed. No concerns    Signed By: Ivania Ma MD     October 12, 2020              Procedure(s):  ESOPHAGOGASTRODUODENOSCOPY (EGD), COLONOSCOPY  COLONOSCOPY    :-  ESOPHAGOGASTRODUODENAL (EGD) BIOPSY  ENDOSCOPIC POLYPECTOMY  RESOLUTION CLIP  INJECTION. MAC    <BSHSIANPOST>    INITIAL Post-op Vital signs: No vitals data found for the desired time range.

## 2020-10-12 NOTE — DISCHARGE INSTRUCTIONS
[address]  217 [facility] [address] [place], [address] [facility]                                  Clinton Back  [id number]  1950    It was my pleasure seeing you for your procedure. You will also receive a summary report with the findings from this procedure and any further recommendations. If you had polyps removed or biopsies taken during your procedure, you will receive a separate letter from me within the next 2 weeks. If you don't receive this letter or if you have any questions, please call my office [phone]. Please take note of the post procedure instructions listed below. Best Wishes,    Dr. Frank Ariza    These instructions give you information on caring for yourself after your procedure. Call your doctor if you have any problems or questions after your procedure. HOME CARE  · Walk if you have belly cramping or gas. Walking will help get rid of the air and reduce the bloated feeling in your belly (abdomen). · Your IV site (where you received drugs) may be tender to touch. Place warm towels on the site; keep your arm up on two pillows if you have any swelling or soreness in the area. · You may shower. ACTIVITY:  · Take frequent rest periods and move at a slower pace for the next 24 hours. .  · You may resume your regular activity tomorrow if you are feeling back to normal.  · Do not drive or ride a bicycle for at least 24 hours (because of the medicine (anesthesia) used during the test). · Do not sign any important legal documents or use or operate any machinery for 24 hours  · Do not take sleeping medicines/nerve drugs for 24 hours unless the doctor tells you. · You can return to work/school tomorrow unless otherwise instructed. NUTRITION:  · Drink plenty of fluids to keep your pee (urine) clear or pale yellow  · Begin with a light meal and progress to your normal diet.  Heavy or fried foods are harder to digest and may make you feel sick to your stomach (nauseated). · Once you are feeling back to normal, you may resume your normal diet as instructed by your doctor. · Avoid alcoholic beverages for 24 hours or as instructed. IF YOU HAD BIOPSIES TAKEN OR POLYPS REMOVED DURING THE PROCEDURE:  · For the next 7 days, avoid all non-steroidal antiinflammatory medications such as Ibuprofen, Motrin, Advil, Alleve, Cheri-seltzer, Goody's powder, BC powder. · If you do not have an heart condition that requires you to take a daily aspirin, you should avoid taking aspirin for 7 days. · Eat a soft diet for 24 hours. · Monitor your stools for any blood or dark black, tar-like, stools as this may be a sign of bleeding and if you see any blood, notify your doctor immediately. GET HELP RIGHT AWAY AND SEEK IMMEDIATE MEDICAL CARE IF:  · You have more than a spotting of blood in your stool. · You pass clumps of tissue (blood clots) or fill the toilet with blood. · Your belly is painfully swollen or puffy (abdominal distention). · You throw up (vomit). · You have a fever. · You have redness, pain or swelling at the IV site that last greater than two days. · You have abdominal pain or discomfort that is severe or gets worse throughout the day. Post-procedure diagnosis:  1.small hiatal hernia  2.irreqular Z line  3. Right sided colon polyps (cecal & ascending colon polyps x 5)  4. transverse colon polyp / inject / heat  5.hepatic flexure / heat  6. left sided colon polyps    Post-procedure recommendations:    EGD Findings:  Esophagus: irregular Z-line (would qualify as C0M2), biopsied. GE junction 38 cm from the incisors.  Small (1-2 cm) sliding hiatal hernia  Stomach: small (5 mm) polyp in the cardia, biopsied, few small benign fundic gland polyps in body  Duodenum: normal    Colon Findings:   Rectum: normal   Sigmoid: normal  Descending Colon: One 11 mm sessile polyp, removed with cold snare, one clip empirically placed  Transverse Colon: One 23 mm sessile polyp, lifted with 12 cc of orise, removed piecemeal  Ascending Colon: Two sessile polyps (3 mm), removed with forceps. 4 sessile polyps (4-8 mm), removed with cold snare. One 12 mm flat polyp on back of IC valve, removed with cold snare and one clip empirically placed over polypectomy site  Cecum: One 4 mm sessile polyp, removed with cold snare  Terminal Ileum: normal    Recommendations:   - Await pathology. You should receive a letter within 2 weeks. - Resume normal medications.  - Recommend repeat colonoscopy in 6 months. - Avoid NSAIDs and aspirin for next 14 days. Patient Education   Patient Education        Colon Polyps: Care Instructions  Your Care Instructions     Colon polyps are growths in the colon or the rectum. The cause of most colon polyps is not known, and most people who get them do not have any problems. But a certain kind can turn into cancer. For this reason, regular testing for colon polyps is important for people as they get older. It is also important for anyone who has an increased risk for colon cancer. Polyps are usually found through routine colon cancer screening tests. Although most colon polyps are not cancerous, they are usually removed and then tested for cancer. Screening for colon cancer saves lives because the cancer can usually be cured if it is caught early. If you have a polyp that is the type that can turn into cancer, you may need more tests to examine your entire colon. The doctor will remove any other polyps that he or she finds, and you will be tested more often. Follow-up care is a key part of your treatment and safety. Be sure to make and go to all appointments, and call your doctor if you are having problems. It's also a good idea to know your test results and keep a list of the medicines you take. How can you care for yourself at home?   Regular exams to look for colon polyps are the best way to prevent polyps from turning into colon cancer. These can include stool tests, sigmoidoscopy, colonoscopy, and CT colonography. Talk with your doctor about a testing schedule that is right for you. To prevent polyps  There is no home treatment that can prevent colon polyps. But these steps may help lower your risk for cancer. · Stay active. Being active can help you get to and stay at a healthy weight. Try to exercise on most days of the week. Walking is a good choice. · Eat well. Choose a variety of vegetables, fruits, legumes (such as peas and beans), fish, poultry, and whole grains. · Do not smoke. If you need help quitting, talk to your doctor about stop-smoking programs and medicines. These can increase your chances of quitting for good. · If you drink alcohol, limit how much you drink. Limit alcohol to 2 drinks a day for men and 1 drink a day for women. When should you call for help? Call your doctor now or seek immediate medical care if:    · You have severe belly pain.     · Your stools are maroon or very bloody. Watch closely for changes in your health, and be sure to contact your doctor if:    · You have a fever.     · You have nausea or vomiting.     · You have a change in bowel habits (new constipation or diarrhea).     · Your symptoms get worse or are not improving as expected. Where can you learn more? Go to http://www.bae.com/  Enter C571 in the search box to learn more about \"Colon Polyps: Care Instructions. \"  Current as of: April 29, 2020               Content Version: 12.6  © 2006-2020 [employer]. Care instructions adapted under license by [employer] (which disclaims liability or warranty for this information). If you have questions about a medical condition or this instruction, always ask your healthcare professional. [place] [address] any warranty or liability for your use of this information.             Hiatal Hernia: Care Instructions  Your Care Instructions  A hiatal hernia occurs when part of the stomach bulges into the chest cavity. A hiatal hernia may allow stomach acid and juices to back up into the esophagus (acid reflux). This can cause a feeling of burning, warmth, heat, or pain behind the breastbone. This feeling may often occur after you eat, soon after you lie down, or when you bend forward, and it may come and go. You also may have a sour taste in your mouth. These symptoms are commonly known as heartburn or reflux. But not all hiatal hernias cause symptoms. Follow-up care is a key part of your treatment and safety. Be sure to make and go to all appointments, and call your doctor if you are having problems. It's also a good idea to know your test results and keep a list of the medicines you take. How can you care for yourself at home? · Take your medicines exactly as prescribed. Call your doctor if you think you are having a problem with your medicine. · Do not take aspirin or other nonsteroidal anti-inflammatory drugs (NSAIDs), such as ibuprofen (Advil, Motrin) or naproxen (Aleve), unless your doctor says it is okay. Ask your doctor what you can take for pain. · Your doctor may recommend over-the-counter medicine. For mild or occasional indigestion, antacids such as Tums, Gaviscon, Maalox, or Mylanta may help. Your doctor also may recommend over-the-counter acid reducers, such as famotidine (Pepcid AC), cimetidine (Tagamet HB), or omeprazole (Prilosec). Read and follow all instructions on the label. If you use these medicines often, talk with your doctor. · Change your eating habits. ? It's best to eat several small meals instead of two or three large meals. ? After you eat, wait 2 to 3 hours before you lie down. Late-night snacks aren't a good idea. ? Chocolate, mint, and alcohol can make heartburn worse. They relax the valve between the esophagus and the stomach. ?  Spicy foods, foods that have a lot of acid (like tomatoes and oranges), and coffee can make heartburn symptoms worse in some people. If your symptoms are worse after you eat a certain food, you may want to stop eating that food to see if your symptoms get better. · Do not smoke or chew tobacco.  · If you get heartburn at night, raise the head of your bed 6 to 8 inches by putting the frame on blocks or placing a foam wedge under the head of your mattress. (Adding extra pillows does not work.)  · Do not wear tight clothing around your middle. · Lose weight if you need to. Losing just 5 to 10 pounds can help. When should you call for help? Call your doctor now or seek immediate medical care if:    · You have new or worse belly pain.     · You are vomiting. Watch closely for changes in your health, and be sure to contact your doctor if:    · You have new or worse symptoms of indigestion.     · You have trouble or pain swallowing.     · You are losing weight.     · You do not get better as expected. Where can you learn more? Go to http://www.bae.com/  Enter T074 in the search box to learn more about \"Hiatal Hernia: Care Instructions. \"  Current as of: April 15, 2020               Content Version: 12.6  © 7335-2599 [employer]. Care instructions adapted under license by [employer] (which disclaims liability or warranty for this information). If you have questions about a medical condition or this instruction, always ask your healthcare professional. [place] [address] any warranty or liability for your use of this information. Learning About Coronavirus [phone])  Coronavirus [phone]): Overview  What is coronavirus (COVID-19)? The coronavirus disease (COVID-19) is caused by a virus. It is an illness that was first found in [place], [place], in December 2019. It has since spread worldwide. The virus can cause fever, cough, and trouble breathing.  In severe cases, it can cause pneumonia and make it hard to breathe without help. It can cause death. Coronaviruses are a large group of viruses. They cause the common cold. They also cause more serious illnesses like Middle East respiratory syndrome (MERS) and severe acute respiratory syndrome (SARS). COVID-19 is caused by a novel coronavirus. That means it's a new type that has not been seen in people before. This virus spreads person-to-person through droplets from coughing and sneezing. It can also spread when you are close to someone who is infected. And it can spread when you touch something that has the virus on it, such as a doorknob or a tabletop. What can you do to protect yourself from coronavirus (COVID-19)? The best way to protect yourself from getting sick is to:  · Avoid areas where there is an outbreak. · Avoid contact with people who may be infected. · Wash your hands often with soap or alcohol-based hand sanitizers. · Avoid crowds and try to stay at least 6 feet away from other people. · Wash your hands often, especially after you cough or sneeze. Use soap and water, and scrub for at least 20 seconds. If soap and water aren't available, use an alcohol-based hand . · Avoid touching your mouth, nose, and eyes. What can you do to avoid spreading the virus to others? To help avoid spreading the virus to others:  · Cover your mouth with a tissue when you cough or sneeze. Then throw the tissue in the trash. · Use a disinfectant to clean things that you touch often. · Stay home if you are sick or have been exposed to the virus. Don't go to school, work, or public areas. And don't use public transportation. · If you are sick:  ? Leave your home only if you need to get medical care. But call the doctor's office first so they know you're coming. And wear a face mask, if you have one.  ? If you have a face mask, wear it whenever you're around other people.  It can help stop the spread of the virus when you cough or sneeze. ? Clean and disinfect your home every day. Use household  and disinfectant wipes or sprays. Take special care to clean things that you grab with your hands. These include doorknobs, remote controls, phones, and handles on your refrigerator and microwave. And don't forget countertops, tabletops, bathrooms, and computer keyboards. When to call for help  Call 911 anytime you think you may need emergency care. For example, call if:  · You have severe trouble breathing. (You can't talk at all.)  · You have constant chest pain or pressure. · You are severely dizzy or lightheaded. · You are confused or can't think clearly. · Your face and lips have a blue color. · You pass out (lose consciousness) or are very hard to wake up. Call your doctor now if you develop symptoms such as:  · Shortness of breath. · Fever. · Cough. If you need to get care, call ahead to the doctor's office for instructions before you go. Make sure you wear a face mask, if you have one, to prevent exposing other people to the virus. Where can you get the latest information? The following health organizations are tracking and studying this virus. Their websites contain the most up-to-date information. [place] also learn what to do if you think you may have been exposed to the virus. · U.S. Centers for Disease Control and Prevention (CDC): The CDC provides updated news about the disease and travel advice. The website also tells you how to prevent the spread of infection. www.cdc.gov  · World Health Organization [facility]): WHO offers information about the virus outbreaks. WHO also has travel advice. www.who.int  Current as of: April 1, 2020               Content Version: 12.4  © 1198-9646 [employer].    Care instructions adapted under license by your healthcare professional. If you have questions about a medical condition or this instruction, always ask your healthcare professional. [address] any warranty or liability for your use of this information.

## 2020-10-27 ENCOUNTER — OFFICE VISIT (OUTPATIENT)
Dept: NEUROLOGY | Age: 70
End: 2020-10-27
Payer: MEDICARE

## 2020-10-27 VITALS
SYSTOLIC BLOOD PRESSURE: 122 MMHG | OXYGEN SATURATION: 100 % | RESPIRATION RATE: 16 BRPM | HEART RATE: 86 BPM | HEIGHT: 64 IN | TEMPERATURE: 97.5 F | WEIGHT: 146 LBS | BODY MASS INDEX: 24.92 KG/M2 | DIASTOLIC BLOOD PRESSURE: 80 MMHG

## 2020-10-27 DIAGNOSIS — R41.3 MEMORY LOSS: ICD-10-CM

## 2020-10-27 DIAGNOSIS — A69.20 LYME DISEASE: ICD-10-CM

## 2020-10-27 DIAGNOSIS — A53.9 SYPHILIS: ICD-10-CM

## 2020-10-27 DIAGNOSIS — C7A.022 MALIGNANT CARCINOID TUMOR OF THE ASCENDING COLON (HCC): ICD-10-CM

## 2020-10-27 DIAGNOSIS — R41.0 DISORIENTATION: ICD-10-CM

## 2020-10-27 DIAGNOSIS — E55.9 VITAMIN D DEFICIENCY: ICD-10-CM

## 2020-10-27 DIAGNOSIS — R41.3 MEMORY LOSS: Primary | ICD-10-CM

## 2020-10-27 DIAGNOSIS — E53.8 VITAMIN B12 DEFICIENCY: ICD-10-CM

## 2020-10-27 PROCEDURE — G8427 DOCREV CUR MEDS BY ELIG CLIN: HCPCS | Performed by: PSYCHIATRY & NEUROLOGY

## 2020-10-27 PROCEDURE — 99354 PR PROLONGED SVC OUTPATIENT SETTING 1ST HOUR: CPT | Performed by: PSYCHIATRY & NEUROLOGY

## 2020-10-27 PROCEDURE — G9711 PT HX TOT COL OR COLON CA: HCPCS | Performed by: PSYCHIATRY & NEUROLOGY

## 2020-10-27 PROCEDURE — G8536 NO DOC ELDER MAL SCRN: HCPCS | Performed by: PSYCHIATRY & NEUROLOGY

## 2020-10-27 PROCEDURE — 1101F PT FALLS ASSESS-DOCD LE1/YR: CPT | Performed by: PSYCHIATRY & NEUROLOGY

## 2020-10-27 PROCEDURE — G8419 CALC BMI OUT NRM PARAM NOF/U: HCPCS | Performed by: PSYCHIATRY & NEUROLOGY

## 2020-10-27 PROCEDURE — 99205 OFFICE O/P NEW HI 60 MIN: CPT | Performed by: PSYCHIATRY & NEUROLOGY

## 2020-10-27 PROCEDURE — G8432 DEP SCR NOT DOC, RNG: HCPCS | Performed by: PSYCHIATRY & NEUROLOGY

## 2020-10-27 NOTE — PROGRESS NOTES
NEUROLOGY HISTORY AND PHYSICAL    Name Abby Lopez Age 72 y.o. MRN [id number]  1950     Referring Physician: Salina Barnhart MD      Chief Complaint:  Memory loss     This is a 72 y.o. Right handed male with a medical history of hypertension. He comes with a complaint mckenna tremors that started in the beginning of MAY. He lost 51 in three months. He has had a CT series and colonoscopy, endoscopy and MRI of the brain and all the tests were unremarkable. He his having headaches (uncommon for him). No tinnitus. He is not as active as he was. Assessment and Plan  1. Memory loss  Very concerning. Discussed possibilities and plan with patient and family. - XR SPINAL PUNC LUMB DX; Future  - MISC. LAB TEST; Future  - GLUCOSE, CSF; Future  - CELL COUNT, CSF; Future  - CELL COUNT, CSF; Future  - CULTURE, CSF W GRAM STAIN; Future  - PROTEIN, CSF; Future  - NEURON SPECIFIC ENOLASE; Future  - PARANEOPLASTIC AUTO-ANTIBODY EVAL. - REFERRAL TO NEUROLOGY    2. Vitamin B12 deficiency  - VITAMIN B12    3. Vitamin D deficiency  - VITAMIN D, 25 HYDROXY    4. Disorientation  - EEG; Future    5. Syphilis  - RPR  - CYTOLOGY NON-GYN; Future    6. Lyme disease  - LYME AB TOTAL W/RFLX W BLOT    7. 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  amLODIPine (NORVASC) 2.5 mg tablet Take 2.5 mg by mouth daily.  atorvastatin (LIPITOR) 40 mg tablet Take 40 mg by mouth daily.  omeprazole (PRILOSEC) 40 mg capsule Take 40 mg by mouth daily.  Cetirizine (ZYRTEC) 10 mg cap Take  by mouth daily.  eye lubricant combination no.1 2-0.9-1.8 % drop Apply  to eye.  aspirin 81 mg tablet Take 81 mg by mouth.       ketotifen (ZADITOR) 0.025 % (0.035 %) ophthalmic solution Administer 1 Drop to both eyes as needed. No current facility-administered medications for this visit. Past Medical History:   Diagnosis Date    GERD (gastroesophageal reflux disease)     Hypertension     Ill-defined condition 2018    factor V     Stroke ([facility] [address]         Social History     Tobacco Use    Smoking status: Former Smoker    Smokeless tobacco: Never Used   Substance Use Topics    Alcohol use: Yes     Alcohol/week: 1.0 standard drinks     Types: 1 Cans of beer per week     Comment: twice a week    Drug use: Not on file        Review of Systems   Constitutional: Positive for diaphoresis and malaise/fatigue. Negative for chills and fever. HENT: Negative for ear pain. Eyes: Negative for pain and discharge. Respiratory: Negative for cough and hemoptysis. Cardiovascular: Negative for chest pain and claudication. Gastrointestinal: Negative for constipation and diarrhea. Genitourinary: Negative for flank pain and hematuria. Musculoskeletal: Positive for myalgias. Negative for back pain. Skin: Negative for itching and rash. Neurological: Positive for headaches. Endo/Heme/Allergies: Negative for environmental allergies. Does not bruise/bleed easily. Psychiatric/Behavioral: Positive for memory loss. Negative for depression and hallucinations. Exam  Visit Vitals  /80 (BP 1 Location: Left arm, BP Patient Position: Sitting)   Pulse 86   Temp 97.5 °F (36.4 °C)   Resp 16   Ht 5' 4\" (1.626 m)   Wt 146 lb (66.2 kg)   SpO2 100%   BMI 25.06 kg/m²         General: Well developed, well nourished. Patient in no distress   Head: Normocephalic, atraumatic, anicteric sclera   Neck Normal ROM, No thyromegally   Lungs:  Clear to auscultation    Cardiac: Regular rate and rhythm with no murmurs.    Abd: Bowel sounds were audible   Ext: No pedal edema   Skin: Supple no rash     NeurologicExam:    Mental Status: Alert and oriented to confused   Speech: Fluent no aphasia or dysarthria. Cranial Nerves:   II-XII Intact    Motor:  Full and symmetric strength of upper and lower ext . Normal bulk and tone. Reflexes:   Deep tendon reflexes 2+/4 and symmetric. Sensory:   Symmetric and intact    Gait:  Gait is balanced    Tremor:   No tremor noted. Cerebellar:  Coordination intact. Neurovascular: No carotid bruits.  No JVD      Lab Review  Lab Results   Component Value Date/Time    WBC 9.8 02/02/2012 12:45 AM    HCT 45.2 02/02/2012 12:45 AM    HGB 15.6 02/02/2012 12:45 AM    PLATELET 280 71/29/1813 12:45 AM     Lab Results   Component Value Date/Time    Sodium 139 02/02/2012 12:45 AM    Potassium 3.9 02/02/2012 12:45 AM    Chloride 101 02/02/2012 12:45 AM    CO2 30 02/02/2012 12:45 AM    Glucose 119 (H) 02/02/2012 12:45 AM    BUN 16 02/02/2012 12:45 AM    Creatinine 1.2 02/02/2012 12:45 AM    Calcium 9.4 02/02/2012 12:45 AM     90  minutes spent more than 50% spent in chart review and face to face  examination, discussion of treatment options and approach

## 2020-10-27 NOTE — PROGRESS NOTES
Chief Complaint   Patient presents with    Memory Loss     forgots things that they already discussed    Tremors     started in April and May of 2020    Dizziness     headache also has experienced some nausea, blurred vision     Visit Vitals  /80 (BP 1 Location: Left arm, BP Patient Position: Sitting)   Pulse 86   Temp 97.5 °F (36.4 °C)   Resp 16   Ht 5' 4\" (1.626 m)   Wt 146 lb (66.2 kg)   SpO2 100%   BMI 25.06 kg/m²

## 2020-10-27 NOTE — LETTER
10/27/20 Patient: Manfred Hong YOB: 1950 Date of Visit: 10/27/2020 Janelle Grant MD 
[address] [address] [address] VIA Facsimile: [phone] Dear Janelle Grant MD, Thank you for referring Mr. Shan Osman to [facility] for evaluation. My notes for this consultation are attached. If you have questions, please do not hesitate to call me. I look forward to following your patient along with you. Sincerely, Cinthia Bell MD

## 2020-11-06 ENCOUNTER — HOSPITAL ENCOUNTER (OUTPATIENT)
Dept: NEUROLOGY | Age: 70
Discharge: HOME | End: 2020-11-06
Attending: PSYCHIATRY & NEUROLOGY
Payer: MEDICARE

## 2020-11-06 DIAGNOSIS — R41.0 DISORIENTATION: ICD-10-CM

## 2020-11-06 PROCEDURE — 95816 EEG AWAKE AND DROWSY: CPT

## 2020-11-06 NOTE — ROUTINE PROCESS
Have you been tested for COVID-19 in the past 7 days? Do you have a personal history of COVID-19 within the past 28 days? NO  If Yes, What was the method of testing: clinical assumption or test result? NO    Have you had close contact with a known to be positive COVID-19 patient within the past 14 days? NO    Are you a healthcare worker or ? NO  If Yes, have you been exposed to COVID-19 without proper PPE? NONE  Do you live in a SNF, adult home or other institutional setting? NO  If Yes, have they experienced a flood of COVID-19 positive patients?     In the past 2-14 days have you had any of the following symptoms NONE   Cough   New onset Shortness of breath or difficulty breathing    Or at least two of these symptoms:   Fever greater than 100 F   Chills   Repeated shaking with chills   Muscle pain   Headache   Sore throat   New loss of taste or smell   New onset diarrhea

## 2020-11-07 LAB
25(OH)D3+25(OH)D2 SERPL-MCNC: 20.6 NG/ML (ref 30–100)
AMPHIPHYSIN AB TITR SER: NEGATIVE TITER
B BURGDOR IGG+IGM SER-ACNC: <0.91 ISR (ref 0–0.9)
CLINICAL BIOCHEMIST REVIEW: NORMAL
CV2 IGG TITR SER: NEGATIVE TITER
GLIAL NUC TYPE 1 AB TITR SER: NEGATIVE TITER
HU1 AB TITR SER: NEGATIVE TITER
HU2 AB TITR SER IF: NEGATIVE TITER
HU3 AB TITR SER: NEGATIVE TITER
NACHR AB SER-SCNC: 0 NMOL/L
PCA-1 AB TITR SER: NEGATIVE TITER
PCA-2 AB TITR SER: NEGATIVE TITER
PCA-TR AB TITR SER: NEGATIVE TITER
REFLEX ADDED: NORMAL
RPR SER QL: NON REACTIVE
STRIA MUS AB TITR SER: NEGATIVE TITER
VGCC-N BIND AB SER-SCNC: 0 NMOL/L
VGCC-P/Q BIND AB SER-SCNC: 0 NMOL/L
VGKC AB SER-SCNC: 0 NMOL/L
VIT B12 SERPL-MCNC: 548 PG/ML (ref 232–1245)

## 2020-11-09 ENCOUNTER — HOSPITAL ENCOUNTER (OUTPATIENT)
Dept: GENERAL RADIOLOGY | Age: 70
Discharge: HOME | End: 2020-11-09
Attending: PSYCHIATRY & NEUROLOGY
Payer: MEDICARE

## 2020-11-09 DIAGNOSIS — R41.3 MEMORY LOSS: ICD-10-CM

## 2020-11-09 LAB
APPEARANCE CSF: CLEAR
APPEARANCE CSF: CLEAR
COLOR CSF: COLORLESS
COLOR CSF: COLORLESS
COMMENT, HOLDF: NORMAL
GLUCOSE CSF-MCNC: 51 MG/DL (ref 40–70)
LYMPHOCYTES NFR CSF MANUAL: 92 % (ref 28–96)
LYMPHOCYTES NFR CSF MANUAL: 96 % (ref 28–96)
MONOCYTES NFR CSF MANUAL: 4 % (ref 16–56)
MONOCYTES NFR CSF MANUAL: 7 % (ref 16–56)
NEUTROPHILS NFR CSF MANUAL: 1 % (ref 0–7)
PROT CSF-MCNC: 132 MG/DL (ref 15–45)
RBC # CSF: 2 /CU MM
RBC # CSF: 3 /CU MM
SAMPLES BEING HELD,HOLD: NORMAL
TUBE # CSF: 1
TUBE # CSF: 4
WBC # CSF: 41 /CU MM (ref 0–5)
WBC # CSF: 44 /CU MM (ref 0–5)

## 2020-11-09 PROCEDURE — 82945 GLUCOSE OTHER FLUID: CPT

## 2020-11-09 PROCEDURE — 62328 DX LMBR SPI PNXR W/FLUOR/CT: CPT

## 2020-11-09 PROCEDURE — 89050 BODY FLUID CELL COUNT: CPT

## 2020-11-09 PROCEDURE — 84157 ASSAY OF PROTEIN OTHER: CPT

## 2020-11-09 PROCEDURE — 88108 CYTOPATH CONCENTRATE TECH: CPT

## 2020-11-09 PROCEDURE — 62270 DX LMBR SPI PNXR: CPT

## 2020-11-09 PROCEDURE — 87205 SMEAR GRAM STAIN: CPT

## 2020-11-09 PROCEDURE — 86592 SYPHILIS TEST NON-TREP QUAL: CPT

## 2020-11-09 PROCEDURE — 88185 FLOWCYTOMETRY/TC ADD-ON: CPT

## 2020-11-09 PROCEDURE — 88184 FLOWCYTOMETRY/ TC 1 MARKER: CPT

## 2020-11-09 PROCEDURE — 86316 IMMUNOASSAY TUMOR OTHER: CPT

## 2020-11-09 PROCEDURE — 36415 COLL VENOUS BLD VENIPUNCTURE: CPT

## 2020-11-09 PROCEDURE — 86256 FLUORESCENT ANTIBODY TITER: CPT

## 2020-11-09 PROCEDURE — 86618 LYME DISEASE ANTIBODY: CPT

## 2020-11-09 NOTE — DISCHARGE INSTRUCTIONS
[address]  Radiology Department  [phone]      Radiologist: Dr. Joselyn Summers    Date:11/9/2020        Lumbar Puncture Discharge Instructions      Go home and rest for the next 24 - 48 hours, rest flat or in a reclined position. Limit your activity, including  standing and walking,  to minimize post procedure complications. Increase your fluid intake over the next 24 to 48 hours, try to minimize the use of caffeine products. This will help your hydrate and help your body replace the CSF fluid that was removed for lab testing. Resume your previous diet and prescribed medications. You make take Tylenol, as directed on the label, for pain or headache. Avoid aspirin or ibuprofen (Motrin or Advil) for the next 24 -  48 hours as it may increase your risk of bleeding. You may shower in 24 hours. Wash area with soap and water. Dry well and keep covered with a band aid. Do not soak or swim until the site is completely healed to minimize the risk of infection. If new, severe headache occurs and does not resolve with the recommended instructions above, call your ordering doctor or seek emergency medical treatment for further evaluation. Follow up with your referring physician as previously discussed. All results will be sent to your ordering physician.

## 2020-11-10 DIAGNOSIS — E55.9 VITAMIN D DEFICIENCY: Primary | ICD-10-CM

## 2020-11-10 LAB — RPR SER QL: NONREACTIVE

## 2020-11-10 RX ORDER — ASPIRIN 325 MG
50000 TABLET, DELAYED RELEASE (ENTERIC COATED) ORAL
Qty: 12 CAP | Refills: 2 | Status: SHIPPED | OUTPATIENT
Start: 2020-11-10

## 2020-11-12 LAB
ANTI-HU AB: NORMAL TITER
ANTI-RI AB: NORMAL TITER

## 2020-11-16 LAB
BACTERIA SPEC CULT: NORMAL
GRAM STN SPEC: NORMAL
GRAM STN SPEC: NORMAL
SERVICE CMNT-IMP: NORMAL

## 2020-11-17 LAB — NSE SERPL IA-MCNC: 5 NG/ML (ref 0–12.5)

## 2020-11-18 LAB
B BURGDOR IGG CSF-ACNC: <0.08 INDEX (ref 0–0.09)
B BURGDOR IGM CSF-ACNC: <0.06 INDEX (ref 0–0.06)

## 2020-12-09 ENCOUNTER — OFFICE VISIT (OUTPATIENT)
Dept: NEUROLOGY | Age: 70
End: 2020-12-09
Payer: MEDICARE

## 2020-12-09 VITALS
OXYGEN SATURATION: 97 % | WEIGHT: 150 LBS | DIASTOLIC BLOOD PRESSURE: 75 MMHG | SYSTOLIC BLOOD PRESSURE: 142 MMHG | BODY MASS INDEX: 25.61 KG/M2 | HEART RATE: 85 BPM | HEIGHT: 64 IN

## 2020-12-09 DIAGNOSIS — G47.33 AUTOIMMUNE ENCEPHALOPATHY WITH PARASOMNIA AND OBSTRUCTIVE SLEEP APNEA: Primary | ICD-10-CM

## 2020-12-09 DIAGNOSIS — G47.50 AUTOIMMUNE ENCEPHALOPATHY WITH PARASOMNIA AND OBSTRUCTIVE SLEEP APNEA: Primary | ICD-10-CM

## 2020-12-09 DIAGNOSIS — E55.9 VITAMIN D DEFICIENCY: ICD-10-CM

## 2020-12-09 DIAGNOSIS — G93.49 AUTOIMMUNE ENCEPHALOPATHY WITH PARASOMNIA AND OBSTRUCTIVE SLEEP APNEA: Primary | ICD-10-CM

## 2020-12-09 DIAGNOSIS — E78.5 HYPERLIPIDEMIA LDL GOAL <70: ICD-10-CM

## 2020-12-09 DIAGNOSIS — I10 ESSENTIAL HYPERTENSION: ICD-10-CM

## 2020-12-09 PROCEDURE — 1101F PT FALLS ASSESS-DOCD LE1/YR: CPT | Performed by: PSYCHIATRY & NEUROLOGY

## 2020-12-09 PROCEDURE — G9711 PT HX TOT COL OR COLON CA: HCPCS | Performed by: PSYCHIATRY & NEUROLOGY

## 2020-12-09 PROCEDURE — G8432 DEP SCR NOT DOC, RNG: HCPCS | Performed by: PSYCHIATRY & NEUROLOGY

## 2020-12-09 PROCEDURE — G8536 NO DOC ELDER MAL SCRN: HCPCS | Performed by: PSYCHIATRY & NEUROLOGY

## 2020-12-09 PROCEDURE — G8419 CALC BMI OUT NRM PARAM NOF/U: HCPCS | Performed by: PSYCHIATRY & NEUROLOGY

## 2020-12-09 PROCEDURE — 99214 OFFICE O/P EST MOD 30 MIN: CPT | Performed by: PSYCHIATRY & NEUROLOGY

## 2020-12-09 PROCEDURE — G8427 DOCREV CUR MEDS BY ELIG CLIN: HCPCS | Performed by: PSYCHIATRY & NEUROLOGY

## 2020-12-09 NOTE — PROGRESS NOTES
Follow-up Visit    Name Kristen Mata Age 72 y.o. MRN [id number]  1950       Chief Complaint: memory loss     Patient returns for a follow up visit. He continues to lose weight and continues with his memory loss and headaches. Unfortunately he did not have his lumbar puncture. He would like to reschedule this. Assesment and Plan  1. Memory loss  Very concerning. Discussed possibilities and plan with patient and family. - XR SPINAL PUNC LUMB DX; Future  - MISC. LAB TEST; Future  - GLUCOSE, CSF; Future  - CELL COUNT, CSF; Future  - CELL COUNT, CSF; Future  - CULTURE, CSF W GRAM STAIN; Future  - PROTEIN, CSF; Future  - NEURON SPECIFIC ENOLASE; Future  - PARANEOPLASTIC AUTO-ANTIBODY EVAL. - REFERRAL TO NEUROLOGY     2. Vitamin D deficiency  Continue Vitamin D supplelemtnation    3. Hypertension   continue Norvasc    4. Hyperlipidemia      Allergies  Codeine     Medications  Current Outpatient Medications   Medication Sig    cholecalciferol (VITAMIN D3) (50,000 UNITS /1250 MCG) capsule Take 1 Cap by mouth every seven (7) days.  lisinopril (PRINIVIL, ZESTRIL) 10 mg tablet Take 10 mg by mouth daily.  amLODIPine (NORVASC) 2.5 mg tablet Take 2.5 mg by mouth daily.  omeprazole (PRILOSEC) 40 mg capsule Take 40 mg by mouth daily.  Cetirizine (ZYRTEC) 10 mg cap Take  by mouth daily.  eye lubricant combination no.1 2-0.9-1.8 % drop Apply  to eye.  ketotifen (ZADITOR) 0.025 % (0.035 %) ophthalmic solution Administer 1 Drop to both eyes as needed.  aspirin 81 mg tablet Take 81 mg by mouth.  atorvastatin (LIPITOR) 40 mg tablet Take 40 mg by mouth daily. No current facility-administered medications for this visit. Medical History  Past Medical History:   Diagnosis Date    GERD (gastroesophageal reflux disease)     Hypertension     Ill-defined condition 2018    factor V     Stroke ([facility] [address]        Review of Systems   Constitutional: Positive for malaise/fatigue.    Eyes: Negative for blurred vision and double vision. Respiratory: Negative for cough and shortness of breath. Cardiovascular: Negative for chest pain, palpitations and orthopnea. Gastrointestinal: Negative for nausea and vomiting. Neurological: Positive for headaches. Negative for dizziness. Psychiatric/Behavioral: Positive for memory loss. Negative for depression. The patient is not nervous/anxious. Exam:  Visit Vitals  BP (!) 142/75   Pulse 85   Ht 5' 4\" (1.626 m)   Wt 150 lb (68 kg)   SpO2 97%   BMI 25.75 kg/m²      General: Well developed, well nourished. Patient in no apparent distress   Head: Normocephalic, atraumatic, anicteric sclera   Neck Normal ROM, No thyromegally   Cardiac: Regular rate and rhythm   Ext: No pedal edema   Skin: Supple no rash     NeurologicExam:  Mental Status: Alert and oriented to person and time   Speech: Fluent no aphasia or dysarthria. Cranial Nerves:  II - XII Intact   Motor:  Full and symmetric strength . Normal bulk and tone. Sensory:   Symmetric and intact    Gait:  Gait is balanced and fluid with normal arm swing. Tremor:   No tremor noted. Cerebellar:  Coordination intact.              Lab Review  Lab Results   Component Value Date/Time    WBC 9.8 02/02/2012 12:45 AM    HCT 45.2 02/02/2012 12:45 AM    HGB 15.6 02/02/2012 12:45 AM    PLATELET 352 71/93/5496 12:45 AM       Lab Results   Component Value Date/Time    Sodium 139 02/02/2012 12:45 AM    Potassium 3.9 02/02/2012 12:45 AM    Chloride 101 02/02/2012 12:45 AM    CO2 30 02/02/2012 12:45 AM    Glucose 119 (H) 02/02/2012 12:45 AM    BUN 16 02/02/2012 12:45 AM    Creatinine 1.2 02/02/2012 12:45 AM    Calcium 9.4 02/02/2012 12:45 AM         Lab Results   Component Value Date/Time    Vitamin B12 548 10/27/2020 02:39 PM

## 2020-12-11 ENCOUNTER — TRANSCRIBE ORDER (OUTPATIENT)
Dept: SCHEDULING | Age: 70
End: 2020-12-11

## 2020-12-11 DIAGNOSIS — R10.11 ABDOMINAL PAIN, RIGHT UPPER QUADRANT: Primary | ICD-10-CM

## 2020-12-21 ENCOUNTER — OFFICE VISIT (OUTPATIENT)
Dept: NEUROLOGY | Age: 70
End: 2020-12-21

## 2020-12-21 DIAGNOSIS — F32.4 MAJOR DEPRESSION SINGLE EPISODE, IN PARTIAL REMISSION (HCC): ICD-10-CM

## 2020-12-21 DIAGNOSIS — G31.84 MILD COGNITIVE IMPAIRMENT WITH MEMORY LOSS: Primary | ICD-10-CM

## 2020-12-21 NOTE — PROGRESS NOTES
This note will not be viewable in "[employer] for the following reason(s). Likely risk of substantial harm from the misinterpretation of data generated by this evaluation                                                                                                         [facility] at [address]    Office:  [phone]  Fax: [phone]                 Initial Office Exam    Patient Name: Jacqueline Artis  Age: 72 y.o. Gender: male   Occupation:[employer]  Handedness: right handed   Presenting Concern: Memory issues vs depression  Primary Care Physician: Zia Salinas MD  Referring Provider: Yohana Luis MD      REASON FOR REFERRAL:  This comprehensive and medically necessary neuropsychological assessment was requested to assist with a differential diagnosis of . The use and purpose of this examination, as well as the extent and limitations of confidentiality, were explained prior to obtaining permission to participate. Instructions were provided regarding the necessity to put forth optimal effort and answer questions truthfully in order to obtain reliable and accurate test results. PERTINENT HISTORY:  Mr. Ovidio Albarado presented for a neuropsychological assessment at the recommendation of his treating physician secondary to complaints of cognitive decline in the past year. He has reported symptoms that include apathy, memory loss, tangentiality, sleeping too much, decreased concentration, depressed, anxiousness, increased irritability, decreased frustration, and increased rigidity of thought. Mr. Ovidio Albarado began noticing symptoms in April 2020. From a brief review of his medical and personal history there has not been any other significant neurological injury or illness noted or reported.   He did not report experiencing depression or anxiety in the past.      Mr. Ovidio Albarado does not  report any problems at birth or difficulties meeting developmental milestones. He reports that he had an adequate level of family support and was not subject to trauma or abuse as a child. Mr. Skylar Ham does not  report being retain in school or receiving special assistance in any of he classes or subjects. Mr. Skylar Ham completed 14 years of education. Mr. Skylar Ham does  exercise on a regular basis and does  maintain a balanced diet. He does  report problems with sleep and does not  complain of pain. He does  participate in mentally stimulating activities. Mr. Skylar Ham does not  have concerns regarding prescription medications, family members, place of residence, or financial stressors. Mr. Skylar Ham indicated that he is independent in his instrumental activities of daily living, including shopping, meal preparation, housekeeping, doing laundry, driving a car, managing medications, and finances. Current Outpatient Medications   Medication Sig    cholecalciferol (VITAMIN D3) (50,000 UNITS /1250 MCG) capsule Take 1 Cap by mouth every seven (7) days.  lisinopril (PRINIVIL, ZESTRIL) 10 mg tablet Take 10 mg by mouth daily.  amLODIPine (NORVASC) 2.5 mg tablet Take 2.5 mg by mouth daily.  atorvastatin (LIPITOR) 40 mg tablet Take 40 mg by mouth daily.  omeprazole (PRILOSEC) 40 mg capsule Take 40 mg by mouth daily.  Cetirizine (ZYRTEC) 10 mg cap Take  by mouth daily.  eye lubricant combination no.1 2-0.9-1.8 % drop Apply  to eye.  ketotifen (ZADITOR) 0.025 % (0.035 %) ophthalmic solution Administer 1 Drop to both eyes as needed.  aspirin 81 mg tablet Take 81 mg by mouth. No current facility-administered medications for this visit. Past Medical History:   Diagnosis Date    GERD (gastroesophageal reflux disease)     Hypertension     Ill-defined condition 2018    factor V     Stroke ([facility] [address]        No flowsheet data found.     No data recorded    Past Surgical History:   Procedure Laterality Date    APPENDECTOMY      COLONOSCOPY N/A 5/11/2018    COLONOSCOPY performed by Tonia Nielson MD at [facility] ENDOSCOPY    COLONOSCOPY Left 10/12/2020    COLONOSCOPY    :- [facility] Deann [facility]land [facility] at [facility] ENDOSCOPY    HX MOHS PROCEDURES      left elbow,right knee,torn ligaments    HX OTHER SURGICAL      colonoscopy/polyp hx       Social History     Socioeconomic History    Marital status:      Spouse name: Not on file    Number of children: Not on file    Years of education: Not on file    Highest education level: Not on file   Tobacco Use    Smoking status: Former Smoker    Smokeless tobacco: Never Used   Substance and Sexual Activity    Alcohol use: Yes     Alcohol/week: 1.0 standard drinks     Types: 1 Cans of beer per week     Comment: twice a week       No family history on file. CT Results (most recent):  Results from Hospital Encounter encounter on 09/08/20   CT ABD PELV W CONT    Narrative EXAM:  CT ABDOMEN PELVIS WITH CONTRAST  INDICATION: . Abnormal weight loss  Additional history:  COMPARISON: None. .  TECHNIQUE:   Multislice helical CT was performed from the diaphragm to the symphysis pubis  with oral and intravenous contrast administration. Contiguous 5 mm axial images  were reconstructed and lung and soft tissue windows were generated. Coronal and  sagittal reformations were generated. CT dose reduction was achieved through use of a standardized protocol tailored  for this examination and automatic exposure control for dose modulation. Yohana Catalan FINDINGS:  INCIDENTALLY IMAGED CHEST:  Heart/vessels: Within normal limits. Lungs/Pleura: Within normal limits. .  ABDOMEN:  Liver: Small, low-attenuation lesion in the tip of the liver that measures 1.4 x  1.6 cm, incompletely characterized. There are multiple, tiny, low-attenuation  lesions in the liver which are too small to accurately characterize  Gallbladder/Biliary: Within normal limits.   Spleen: Within normal limits. Pancreas: Within normal limits. Adrenals: Within normal limits. Kidneys: Focal areas of cortical thinning in the right kidney suggesting remote  insults. There is a large, exophytic, low-attenuation lesion projecting off the  anterior left kidney superiorly which measures approximately 9 cm in maximum  extent. Peritoneum/Mesenteries: Within normal limits. Extraperitoneum: Within normal limits. Gastrointestinal tract: Small duodenal diverticulum directed superiorly at the  junction of the 2nd/3rd portion. Appendix not visualized. Vascular: Calcifications in the aorta. Willi Chapa PELVIS:  Extraperitoneum: Within normal limits. Ureters: Within normal limits. Bladder: Minimal, concentric mural thickening, nonspecific, possibly related to  bladder outlet obstruction/prostatomegaly. Reproductive System: Prostatomegaly. Calcifications in the prostate. .  MSK:   1 anterior listhesis of L5 on S1 with bilateral, L5 pars defects. There are  minimal, nonfocal degenerative changes throughout the spine. Degenerative  changes in both hips. .    Impression IMPRESSION:   1. No CT explanation for the patient's weight loss. 2. Incidental findings as above. MRI Results (most recent):  Results from [place] encounter on 09/30/20   MRI BRAIN WO CONT    Narrative EXAM: MRI BRAIN WO CONT    INDICATION: Ataxia, tremor. .        COMPARISON: None. CONTRAST: None. TECHNIQUE:    Multiplanar multisequence acquisition without contrast of the brain. FINDINGS:  Diffusion imaging does not show acute ischemic changes. There is no extra-axial fluid collection, hemorrhage or shift. Flow voids in major vessels at the base of the brain are present. Intimal nonspecific white matter changes. No mass. Impression IMPRESSION: No acute findings. No mass. Minimal nonspecific white matter  changes.             MENTAL STATUS:    Orientation:  Oriented to year, season, month, day of month   Eye Contact:  Appropriate Motor Behavior:   Ambulates independently with stable gait   Speech:   Fluent and intelligible   Thought Process: Tangential   Thought Content:  No evidence of hallucinations or delusions   Suicidal ideations:  Denies   Mood:   Euthymic   Affect:   Full range   Concentration:   Mildly decreased   Abstraction:   Impaired   Insight:   Decreased     On the Modified Mini-Mental Status Exam: 84/100  (< 2%ile) Mod-Severe impairment    DIAGNOSTIC IMPRESSIONS:    ICD-10-CM ICD-9-CM    1. Mild cognitive impairment with memory loss  G31.84 331.83    2. Major depression single episode, in partial remission ([facility] [place] [address]  F32.4 296.25              PLAN:  1. Complete a comprehensive neuropsychological assessment to provide a differential diagnosis of presenting concerns as well as to assist with disposition and treatment planning as appropriate. 2. Consider compensatory and remedial cognitive training. 3. Consider nonpharmacological interventions for depression. 4. Consider an adaptive driving evaluation. 5. Consider referral for elder health nurse to provide an in-home functional assessment. [address]31 x 1 Review of records. Face to face interview w/ patient. Determine test protocol: 60 minutes. Total 1 unit        Gretta Baugh, PhD, ABPP, LCP  Licensed Clinical Psychologist/ Neuropsychologist        This note will not be viewable in [address]

## 2020-12-28 ENCOUNTER — HOSPITAL ENCOUNTER (OUTPATIENT)
Dept: GENERAL RADIOLOGY | Age: 70
Discharge: HOME | End: 2020-12-28
Attending: PSYCHIATRY & NEUROLOGY
Payer: MEDICARE

## 2020-12-28 VITALS
DIASTOLIC BLOOD PRESSURE: 82 MMHG | SYSTOLIC BLOOD PRESSURE: 142 MMHG | HEART RATE: 79 BPM | RESPIRATION RATE: 22 BRPM | TEMPERATURE: 98.8 F | OXYGEN SATURATION: 98 %

## 2020-12-28 DIAGNOSIS — G47.50 AUTOIMMUNE ENCEPHALOPATHY WITH PARASOMNIA AND OBSTRUCTIVE SLEEP APNEA: ICD-10-CM

## 2020-12-28 DIAGNOSIS — G93.49 AUTOIMMUNE ENCEPHALOPATHY WITH PARASOMNIA AND OBSTRUCTIVE SLEEP APNEA: ICD-10-CM

## 2020-12-28 DIAGNOSIS — G47.33 AUTOIMMUNE ENCEPHALOPATHY WITH PARASOMNIA AND OBSTRUCTIVE SLEEP APNEA: ICD-10-CM

## 2020-12-28 LAB
APPEARANCE CSF: CLEAR
APPEARANCE CSF: CLEAR
COLOR CSF: COLORLESS
COLOR CSF: COLORLESS
GLUCOSE CSF-MCNC: 50 MG/DL (ref 40–70)
LYMPHOCYTES NFR CSF MANUAL: 82 % (ref 28–96)
LYMPHOCYTES NFR CSF MANUAL: 82 % (ref 28–96)
MONOCYTES NFR CSF MANUAL: 14 % (ref 16–56)
MONOCYTES NFR CSF MANUAL: 15 % (ref 16–56)
NEUTROPHILS NFR CSF MANUAL: 3 % (ref 0–7)
NEUTROPHILS NFR CSF MANUAL: 4 % (ref 0–7)
PROT CSF-MCNC: 110 MG/DL (ref 15–45)
RBC # CSF: 1 /CU MM
RBC # CSF: 2 /CU MM
TUBE # CSF: 1
TUBE # CSF: 4
WBC # CSF: 41 /CU MM (ref 0–5)
WBC # CSF: 46 /CU MM (ref 0–5)

## 2020-12-28 PROCEDURE — 36415 COLL VENOUS BLD VENIPUNCTURE: CPT

## 2020-12-28 PROCEDURE — 77030014133 XR SPINAL PUNC LUMB DX

## 2020-12-28 PROCEDURE — 86316 IMMUNOASSAY TUMOR OTHER: CPT

## 2020-12-28 PROCEDURE — 82945 GLUCOSE OTHER FLUID: CPT

## 2020-12-28 PROCEDURE — 89050 BODY FLUID CELL COUNT: CPT

## 2020-12-28 PROCEDURE — 84157 ASSAY OF PROTEIN OTHER: CPT

## 2020-12-28 PROCEDURE — 86317 IMMUNOASSAY INFECTIOUS AGENT: CPT

## 2020-12-28 NOTE — ROUTINE PROCESS
Pt given and reviewed with discharge instructions regarding post lumbar puncture care. Pt voices complete understanding of all instructions given. Pt taken out via wheelchair and discharged to home with wife in car.

## 2020-12-28 NOTE — DISCHARGE INSTRUCTIONS
[facility] (/[facility])  Radiology Department  [phone]      Radiologist: Dr. Ashley Corea    Date: 12/28/2020        Lumbar Puncture Discharge Instructions      Go home and rest for the next 24 - 48 hours, rest flat or in a reclined position. Limit your activity, including  standing and walking,  to minimize post procedure complications. Increase your fluid intake over the next 24 to 48 hours, try to minimize the use of caffeine products. This will help your hydrate and help your body replace the CSF fluid that was removed for lab testing. Resume your previous diet and prescribed medications. You make take Tylenol, as directed on the label, for pain or headache. Avoid aspirin or ibuprofen (Motrin or Advil) for the next 24 -  48 hours as it may increase your risk of bleeding. You may shower in 24 hours. Wash area with soap and water. Dry well and keep covered with a band aid. Do not soak or swim until the site is completely healed to minimize the risk of infection. If new, severe headache occurs and does not resolve with the recommended instructions above, call your ordering doctor or seek emergency medical treatment for further evaluation. Follow up with your referring physician as previously discussed. All results will be sent to your ordering physician.

## 2020-12-30 LAB — NSE SERPL IA-MCNC: 5.6 NG/ML (ref 0–12.5)

## 2021-01-05 ENCOUNTER — OFFICE VISIT (OUTPATIENT)
Dept: NEUROLOGY | Age: 71
End: 2021-01-05
Payer: MEDICARE

## 2021-01-05 DIAGNOSIS — F03.90 MAJOR NEUROCOGNITIVE DISORDER DUE TO ALZHEIMER'S DISEASE, PROBABLE, WITHOUT BEHAVIORAL DISTURBANCE: Primary | ICD-10-CM

## 2021-01-05 DIAGNOSIS — F43.21 ADJUSTMENT DISORDER WITH DEPRESSED MOOD: ICD-10-CM

## 2021-01-05 PROCEDURE — 96132 NRPSYC TST EVAL PHYS/QHP 1ST: CPT | Performed by: PSYCHOLOGIST

## 2021-01-05 PROCEDURE — 96133 NRPSYC TST EVAL PHYS/QHP EA: CPT | Performed by: PSYCHOLOGIST

## 2021-01-05 PROCEDURE — 96138 PSYCL/NRPSYC TECH 1ST: CPT | Performed by: PSYCHOLOGIST

## 2021-01-05 PROCEDURE — 96136 PSYCL/NRPSYC TST PHY/QHP 1ST: CPT | Performed by: PSYCHOLOGIST

## 2021-01-05 PROCEDURE — 96137 PSYCL/NRPSYC TST PHY/QHP EA: CPT | Performed by: PSYCHOLOGIST

## 2021-01-05 PROCEDURE — 96139 PSYCL/NRPSYC TST TECH EA: CPT | Performed by: PSYCHOLOGIST

## 2021-01-05 NOTE — PROGRESS NOTES
This note will not be viewable in [employer] for the following reason(s). Likely risk of substantial harm from the misinterpretation of data generated by this evaluation. [facility] at [address]    Office:  [phone]  Fax: [phone]                                             Neuropsychological Evaluation Report    Patient Name: Ericka Bonilla  Age: 72 y.o. Gender: male   Occupation:[employer]  Handedness: right handed   Presenting Concern: Memory issues vs depression  Primary Care Physician: Wellington Armijo MD  Referring Provider: Sandra Damian MD     PATIENT HISTORY (OBTAINED DURING INITIAL CLINICAL EVALUATION):    REASON FOR REFERRAL:  This comprehensive and medically necessary neuropsychological assessment was requested to assist with a differential diagnosis of memory impairment. The use and purpose of this examination, as well as the extent and limitations of confidentiality, were explained prior to obtaining permission to participate. Instructions were provided regarding the necessity to put forth optimal effort and answer questions truthfully in order to obtain reliable and accurate test results.     PERTINENT HISTORY:  Mr. Blu Rubio presented for a neuropsychological assessment at the recommendation of his treating physician secondary to complaints of cognitive decline in the past year. He has reported symptoms that include apathy, memory loss, tangentiality, sleeping too much, decreased concentration, depressed, anxiousness, increased irritability, decreased frustration, and increased rigidity of thought. Mr. Blu Rubio began noticing symptoms in April 2020. From a brief review of his medical and personal history there has not been any other significant neurological injury or illness noted or reported.   He did not report experiencing depression or anxiety in the past.    Mr. Javier Nguyễn does not  report any problems at birth or difficulties meeting developmental milestones. He reports that he had an adequate level of family support and was not subject to trauma or abuse as a child. Mr. Javier Nguyễn does not  report being retain in school or receiving special assistance in any of he classes or subjects. Mr. Javier Nguyễn completed 14 years of education.     Mr. Javier Nguyễn does  exercise on a regular basis and does  maintain a balanced diet. He does  report problems with sleep and does not  complain of pain. He does  participate in mentally stimulating activities. Mr. Javier Nguyễn does not  have concerns regarding prescription medications, family members, place of residence, or financial stressors. Mr. Javier Nguyễn indicated that he is independent in his instrumental activities of daily living, including shopping, meal preparation, housekeeping, doing laundry, driving a car, managing medications, and finances.     METHODS OF ASSESSMENT (Current Evaluation):  Clinician Administered:  Clinical Interview  Review of Medical Records  Clock Drawing Task  Modified Mini-Mental Status Exam (3MS)  Test of Premorbid [facility] and Depression Scale  Revised Memory and Behavior Checklist    Technician Administered:  Melvin Dementia Rating Scale-2  Neuropsychological Assessment Battery   NAB: Executive Functions Module 2  RBANS-A  Reliable Digit Span  Test of Memory Malingering  Test of Practical Judgment (9-Item)  [place] Functional Living Scale  Trail Making Test    TEST OBSERVATIONS:  Mr. Javier Nguyễn arrived promptly for the testing session. Dress and grooming were appropriate; physical presentation was unchanged from that observed during the clinical interview. Speech was fluent, intelligible, and goal-directed. Affect was congruent with the euthymic mood conveyed. Mr. Javier Nguyễn was adequately cooperative and appeared to put forth good effort throughout this examination.   Rapport with the examiner was adequately established and maintained. Minimal prompting was required. Comprehension of test instructions was not problematic, however patient discontinued several tasks due to blurry vision. Performance motivation was objectively measured by three instruments (RBANS EI, Reliable Digit Span, and TOMM), and Mr. Susana Samaniego produced a normal score on two of these measures. Accordingly, test findings below do not appear to be the product of disingenuous effort. Given the above observations, plus comments contained in the Mental Status section, the results of this examination are regarded as reasonably reliable and valid. TEST RESULTS:  Quantitative test results are derived from comparisons to age and education corrected cohort normative data, where applicable. Percentiles are included in these instances. Qualitative test results are determined using clinical observations. General Orientation and Awareness:       Orientation to person, year, month, day of month, day of week, state, town, and circumstance.    Awareness of deficits:  WNL                   Cognitive performance validity testing:  Valid        Attention/Concentration:      Classification:   Digit span (14 percentile)                      Low Average  Simple visuomotor tracking (<0.1 percentile)               Severely Impaired  Digits forward (1 percentile)                 Severely Impaired  Digits backward (2 percentile)                 Moderately Impaired    Visuospatial and Constructional Praxis:     Figure copy (<0.1 percentile)                                       Severely Impaired  Line orientation (1 percentile)                                                  Severely Impaired     Language:         Picture naming (73 percentile)                               Average  Semantic fluency (2 percentile)                      Moderately Impaired    Memory and Learning:       Immediate word list learning (1 percentile)               Severely Impaired  Delayed word list recall (0.1 percentile)                  Severely Impaired  Delayed word list recognition (12 percentile)               Low Average  Immediate story memory (2 percentile)                  Moderately Impaired  Delayed story recall (1 percentile)                Severely Impaired  Delayed figure recall (3 percentile)                Moderately Impaired    Cognitive Tests of Executive Functioning:     Ability to think flexibly, Trail Making B (<0.1 percentile)              Severely Impaired  Mazes (16 percentile)                  Low Average  Simple judgment in daily decision making (7 percentile)                         Mildly Impaired  Complex judgment in daily decision making (<1 percentile)              Severely Impaired  Word generation (21 percentile)                  Low Average     Melvin Dementia Rating Scale - 2:        Scale                           SS                   Percentile  Initiation/Perseveration      5                       5                                           Mildly Impaired  Construction                      5                       5                                           Mildly Impaired  Conceptualization             11                     71                                          Average     Adaptive Behavioral Measure of Daily Functioning:   Time:    25 %ile   Money/calculations:   <2 %ile  Communication:    <2 %ile   Memory:       9 %ile    Total:     T= 26 (0.8%ile)    Intellectual and Basic Cognitive Functioning (WAIS-IV):  ACS Test of pre-morbid functioning reading recognition lower limit estimated WAIS-IV FSIQ score:       Estimated full scale IQ:           76 6 percentile       Mildly Impaired       IMPRESSIONS:  Mr. Artem Hall was seen for a comprehensive neuropsychological evaluation and administered a battery of measures assessing the neurocognitive domains of attention, visual-spatial, language, memory, and executive functioning.   His overall level of functioning was in the severely impaired range when domain scores were averaged. His performance on 4 out of 5 fell in the severely impaired range. The only domain not significantly impaired was language which fell in the low average range. His semantic fluency was noted to be moderately impaired. His performance on a measure of adaptive functioning was also severely impaired. On a measure of basic cognitive ability he demonstrated mild impairment on constructional tasks and tasks assessing initiation and perseveration. His ability to assess identify similarities and differences among sets of objects presented both visually and verbally was in the average range. Overall his current level of cognitive functioning falls far below his estimated premorbid level, and appears consistent with a cognitive dementia. DIAGNOSTIC IMPRESSIONS:    ICD-10-CM ICD-9-CM    1. Major neurocognitive disorder due to Alzheimer's disease, probable, without behavioral disturbance ([facility])  F01.50 331.0 [place] NEUROPSYCHOLOGICAL TST EVAL PHYS/QHP 1ST HOUR     294.10 [place] NEUROPSYCHOLOGICAL TST EVAL PHYS/QHP EA ADDL HR      [place] PSYL/NRPSYCL TST PHYS/QHP 2+ TST 1ST 30 MIN      OK PSYCL/NRPSYCL TST PHYS/QHP 2+ TST EA ADDL 30 MIN      OK PSYCL/NRPSYCL TST TECH 2+ TST 1ST 30 MIN      OK PSYCL/NRPSYCL TST TECH 2+ TST EA ADDL 30 MIN      OK PSYCL/NRPSYCL TST TECH 2+ TST EA ADDL 30 MIN      OK PSYCL/NRPSYCL TST TECH 2+ TST EA ADDL 30 MIN      OK PSYCL/NRPSYCL TST TECH 2+ TST EA ADDL 30 MIN      OK PSYCL/NRPSYCL TST TECH 2+ TST EA ADDL 30 MIN   2. Adjustment disorder with depressed mood  F43.21 309.0 [place] NEUROPSYCHOLOGICAL TST EVAL PHYS/QHP 1ST HOUR      [place] NEUROPSYCHOLOGICAL TST EVAL PHYS/QHP EA ADDL HR      [place] PSYL/NRPSYCL TST PHYS/QHP 2+ TST 1ST 30 MIN      OK PSYCL/NRPSYCL TST PHYS/QHP 2+ TST EA ADDL 30 MIN      OK PSYCL/NRPSYCL TST TECH 2+ TST 1ST 30 MIN         RECOMMENDATIONS:   1.  Findings should be reviewed with Mr. Susana Samaniego to insure his understanding and discuss the potential implications. 2. Emphasis should be placed on Mr. Javier Nguyễn obtaining good sleep hygiene and maintaining adequate physical exercise to promote good brain health. 3. Mr. Javier Nguyễn may benefit from a referral to psychotherapy to address his emotional distress. 4. Mr. Javier Nguyễn should consider completing a formal adaptive driving evaluation if he wishes to continue to drive. 5. Mr. Javier Nguyễn is encouraged to seek out various forms of mental stimulation that would help to \"exercise\" his brain. This might include participating in a hobby, reading, or playing table top games. 6. Mr. Javier Nguyễn will require increased levels of supervision, particularly outside his home or familiar environments. Thank you for allowing me the opportunity to assist you in Mr. Dunham's care. Please do not hesitate to contact me should you have additional questions that I may not have addressed. 81679 x 1  96138 x 1  96139 x 6  96132 x 1  96133 x 2          Alessandro Green, Ph.D., ABPP  Licensed Clinical Psychologist  Neuropsychologist  Board Certified Rehabilitation Psychologist      Time Documentation:     35784 x 1: Neurobehavioral Status Exam/Clinical Interview: (1 hour, (already billed on first date of service)     11294*2 Neuropsych testing/data gathering by Neuropsychologist (35 additional minutes, see above)      96138 x 1  96139 x 6 Test Administration/Data Gathering By Technician: (3.5 hours). 12500 x 1 (first 30 minutes), 01474 x 7 (each additional 30 minutes)     96132 x 1  96133 x 1 Testing Evaluation Services by Neuropsychologist (1 hour, 50 minutes) 96132 x 1 (first hour), 96133 x 1 (50 minutes)     Definitions:       77171/77763:  Neurobehavioral Status Exam, Clinical interview.   Clinical assessment of thinking, reasoning and judgment, by neuropsychologist, both face to face time with patient and time interpreting those test results and reporting, first and subsequent hours)     74523/82668: Neuropsychological Test Administration by Technician/Psychometrist, first 30 minutes and each additional 30 minutes. The above includes: Record review. Review of history provided by patient. Review of collaborative information. Testing by Clinician. Review of raw data. Scoring. Report writing of individual tests administered by Clinician. Integration of individual tests administered by psychometrist with NSE/testing by clinician, review of records/history/collaborative information, case Conceptualization, treatment planning, clinical decision making, report writing, coordination Of Care. Psychometry test codes as time spent by psychometrist administering and scoring neurocognitive/psychological tests under supervision of neuropsychologist.  Integral services including scoring of raw data, data interpretation, case conceptualization, report writing etcetera were initiated after the patient finished testing/raw data collected and was completed on the date the report was signed. This note will not be viewable in [address]

## 2021-01-07 ENCOUNTER — TELEPHONE (OUTPATIENT)
Dept: NEUROLOGY | Age: 71
End: 2021-01-07

## 2021-01-07 NOTE — TELEPHONE ENCOUNTER
----- Message from [employer] sent at 1/7/2021  8:19 AM EST -----  Regarding: Dr. Chiara Sheridan  General Message/Vendor Calls    Caller's first and last name: Elena Paredes other      Reason for call: discuss memory testing      Callback required yes/no and why: Yes      Best contact number(s): [phone]      Details to clarify the request: April Lynn would like to make sure that after pt's memory testing on 01/05, she doesn't need to make a follow up appt with him for the results. Please advise.       Zayo

## 2021-01-15 ENCOUNTER — OFFICE VISIT (OUTPATIENT)
Dept: NEUROLOGY | Age: 71
End: 2021-01-15
Payer: MEDICARE

## 2021-01-15 DIAGNOSIS — F03.90 MAJOR NEUROCOGNITIVE DISORDER DUE TO ALZHEIMER'S DISEASE, PROBABLE, WITHOUT BEHAVIORAL DISTURBANCE: Primary | ICD-10-CM

## 2021-01-15 DIAGNOSIS — F43.21 ADJUSTMENT DISORDER WITH DEPRESSED MOOD: ICD-10-CM

## 2021-01-15 PROCEDURE — 90832 PSYTX W PT 30 MINUTES: CPT | Performed by: PSYCHOLOGIST

## 2021-01-15 NOTE — PROGRESS NOTES
Pursuant to the emergency declaration under the [facility] waiver authority and the [employer] Act, this Virtual Visit was conducted, with appropriate consent obtained, to reduce the patient's risk of exposure to COVID-19 and provide continuity of care   Services were provided in this manner to substitute for in-person clinic visit. The originating site is the patient's home and the distance site is [facility] at [facility]. These types of teleneuropsychology/telehealth/telemedicine visits were authorized by the President of the [place], though I/we cannot guarantee what a third party payor will do reimbursement/coverage wise. I indicated that I would evaluate the patient and recommend diagnostics and treatment based on my assessment and impressions, and that our sessions are not being recorded and that personal health information is protected to the best of our abilities. Trena Meza is a 72 y.o. male who presents today for feedback following recent neuropsychological testing. The results were reviewed of the recent neuropsychological evaluation, including discussing individual tests as well as the patient's areas of neurocognitive strength versus their weaknesses. Education was provided regarding my diagnostic impressions, and we discussed next steps for further evaluation down the road. I all also answered numerous questions related to the clinical findings, including discussing various methods to improve cognitive cognition and mood when relevant. The patient is encouraged to follow-up with the referring provider. DIAGNOSTIC IMPRESSIONS:    ICD-10-CM ICD-9-CM    1. Major neurocognitive disorder due to Alzheimer's disease, probable, without behavioral disturbance ([facility] [address]  F01.50 331.0      294.10    2.  Adjustment disorder with depressed mood F43.21 309.0        88590 30 minutes x 1    Christian Aldridge, PHD

## 2021-02-10 LAB
14-3-3 AG CSF QL: NORMAL
SPECIMEN STATUS, 230163 CJD1: NORMAL

## 2021-03-17 ENCOUNTER — OFFICE VISIT (OUTPATIENT)
Dept: NEUROLOGY | Age: 71
End: 2021-03-17
Payer: MEDICARE

## 2021-03-17 VITALS
RESPIRATION RATE: 18 BRPM | SYSTOLIC BLOOD PRESSURE: 130 MMHG | DIASTOLIC BLOOD PRESSURE: 68 MMHG | TEMPERATURE: 97.5 F | OXYGEN SATURATION: 98 % | HEART RATE: 68 BPM

## 2021-03-17 DIAGNOSIS — I10 ESSENTIAL HYPERTENSION: ICD-10-CM

## 2021-03-17 DIAGNOSIS — G04.90 ENCEPHALITIS: Primary | ICD-10-CM

## 2021-03-17 DIAGNOSIS — E55.9 VITAMIN D DEFICIENCY: ICD-10-CM

## 2021-03-17 PROCEDURE — G8432 DEP SCR NOT DOC, RNG: HCPCS | Performed by: PSYCHIATRY & NEUROLOGY

## 2021-03-17 PROCEDURE — G9711 PT HX TOT COL OR COLON CA: HCPCS | Performed by: PSYCHIATRY & NEUROLOGY

## 2021-03-17 PROCEDURE — G8536 NO DOC ELDER MAL SCRN: HCPCS | Performed by: PSYCHIATRY & NEUROLOGY

## 2021-03-17 PROCEDURE — 99214 OFFICE O/P EST MOD 30 MIN: CPT | Performed by: PSYCHIATRY & NEUROLOGY

## 2021-03-17 PROCEDURE — G8419 CALC BMI OUT NRM PARAM NOF/U: HCPCS | Performed by: PSYCHIATRY & NEUROLOGY

## 2021-03-17 PROCEDURE — 1101F PT FALLS ASSESS-DOCD LE1/YR: CPT | Performed by: PSYCHIATRY & NEUROLOGY

## 2021-03-17 PROCEDURE — G8427 DOCREV CUR MEDS BY ELIG CLIN: HCPCS | Performed by: PSYCHIATRY & NEUROLOGY

## 2021-03-17 PROCEDURE — G8752 SYS BP LESS 140: HCPCS | Performed by: PSYCHIATRY & NEUROLOGY

## 2021-03-17 PROCEDURE — G8754 DIAS BP LESS 90: HCPCS | Performed by: PSYCHIATRY & NEUROLOGY

## 2021-03-17 NOTE — PROGRESS NOTES
Follow-up Visit    Name Ariadne Zazueta Age 72 y.o. MRN [id number]  1950       Chief Complaint: memory loss     Patient returns for a follow up visit. He feels much better and is back to his normal self. He feels much better. I explained it to his wife. They are satisfied with the explanation. Assesment and Plan  1. Encephalitis   Seems to have recovered considerably. He has put weight back on and is back to his normal self according to his wife. We discussed the possibility of an encephalitis viral or autoimmune.      2. Vitamin D deficiency  Retest vitamin D to determine if he needs more. 3.  Hypertension   continue Norvasc      Allergies  Codeine      Medications  Current Outpatient Medications   Medication Sig    lisinopril (PRINIVIL, ZESTRIL) 10 mg tablet Take 10 mg by mouth daily.  amLODIPine (NORVASC) 2.5 mg tablet Take 2.5 mg by mouth daily.  omeprazole (PRILOSEC) 40 mg capsule Take 40 mg by mouth daily.  Cetirizine (ZYRTEC) 10 mg cap Take  by mouth daily.  eye lubricant combination no.1 2-0.9-1.8 % drop Apply  to eye.  ketotifen (ZADITOR) 0.025 % (0.035 %) ophthalmic solution Administer 1 Drop to both eyes as needed.  aspirin 81 mg tablet Take 81 mg by mouth.  cholecalciferol (VITAMIN D3) (50,000 UNITS /1250 MCG) capsule Take 1 Cap by mouth every seven (7) days. No current facility-administered medications for this visit. 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 Eyes: Negative for blurred vision and double vision. Respiratory: Negative for cough and shortness of breath. Cardiovascular: Negative for chest pain, palpitations and orthopnea. Gastrointestinal: Negative for nausea and vomiting. Neurological: Positive for headaches. Negative for dizziness. Psychiatric/Behavioral: Positive for memory loss. Negative for depression. The patient is not nervous/anxious. Exam:  Visit Vitals  /68 (BP 1 Location: Left arm, BP Patient Position: Sitting, BP Cuff Size: Adult)   Pulse 68   Temp 97.5 °F (36.4 °C) (Temporal)   Resp 18   SpO2 98%      General: Well developed, well nourished. Patient in no apparent distress   Head: Normocephalic, atraumatic, anicteric sclera   Neck Normal ROM, No thyromegally   Cardiac: Regular rate and rhythm   Ext: No pedal edema   Skin: Supple no rash     NeurologicExam:  Mental Status: Alert and oriented to person and time   Speech: Fluent no aphasia or dysarthria. Cranial Nerves:  II - XII Intact   Motor:  Full and symmetric strength . Normal bulk and tone. Sensory:   Symmetric and intact    Gait:  Gait is balanced and fluid with normal arm swing. Tremor:   No tremor noted. Cerebellar:  Coordination intact.          Lab Review  Lab Results   Component Value Date/Time    WBC 9.8 02/02/2012 12:45 AM    HCT 45.2 02/02/2012 12:45 AM    HGB 15.6 02/02/2012 12:45 AM    PLATELET 758 76/15/6815 12:45 AM       Lab Results   Component Value Date/Time    Sodium 139 02/02/2012 12:45 AM    Potassium 3.9 02/02/2012 12:45 AM    Chloride 101 02/02/2012 12:45 AM    CO2 30 02/02/2012 12:45 AM    Glucose 119 (H) 02/02/2012 12:45 AM    BUN 16 02/02/2012 12:45 AM    Creatinine 1.2 02/02/2012 12:45 AM    Calcium 9.4 02/02/2012 12:45 AM         Lab Results   Component Value Date/Time    Vitamin B12 548 10/27/2020 02:39 PM

## 2021-07-14 ENCOUNTER — TRANSCRIBE ORDER (OUTPATIENT)
Dept: SCHEDULING | Age: 71
End: 2021-07-14

## 2021-07-14 DIAGNOSIS — R31.0 GROSS HEMATURIA: Primary | ICD-10-CM

## 2021-07-15 RX ORDER — ATORVASTATIN CALCIUM 40 MG/1
40 TABLET, FILM COATED ORAL DAILY
COMMUNITY

## 2021-07-16 ENCOUNTER — ANESTHESIA EVENT (OUTPATIENT)
Dept: ENDOSCOPY | Age: 71
End: 2021-07-16
Payer: MEDICARE

## 2021-07-16 ENCOUNTER — ANESTHESIA (OUTPATIENT)
Dept: ENDOSCOPY | Age: 71
End: 2021-07-16
Payer: MEDICARE

## 2021-07-16 ENCOUNTER — HOSPITAL ENCOUNTER (OUTPATIENT)
Age: 71
Setting detail: OUTPATIENT SURGERY
Discharge: HOME | End: 2021-07-16
Attending: SPECIALIST | Admitting: SPECIALIST
Payer: MEDICARE

## 2021-07-16 VITALS
RESPIRATION RATE: 20 BRPM | HEIGHT: 64 IN | BODY MASS INDEX: 29.43 KG/M2 | HEART RATE: 59 BPM | OXYGEN SATURATION: 95 % | WEIGHT: 172.4 LBS | TEMPERATURE: 98.3 F | SYSTOLIC BLOOD PRESSURE: 129 MMHG | DIASTOLIC BLOOD PRESSURE: 74 MMHG

## 2021-07-16 PROCEDURE — 76060000031 HC ANESTHESIA FIRST 0.5 HR: Performed by: SPECIALIST

## 2021-07-16 PROCEDURE — 74011250636 HC RX REV CODE- 250/636: Performed by: REGISTERED NURSE

## 2021-07-16 PROCEDURE — 74011000250 HC RX REV CODE- 250: Performed by: REGISTERED NURSE

## 2021-07-16 PROCEDURE — 88305 TISSUE EXAM BY PATHOLOGIST: CPT

## 2021-07-16 PROCEDURE — 77030013992 HC SNR POLYP ENDOSC BSC -B: Performed by: SPECIALIST

## 2021-07-16 PROCEDURE — 74011250636 HC RX REV CODE- 250/636: Performed by: SPECIALIST

## 2021-07-16 PROCEDURE — 2709999900 HC NON-CHARGEABLE SUPPLY: Performed by: SPECIALIST

## 2021-07-16 PROCEDURE — 76040000019: Performed by: SPECIALIST

## 2021-07-16 RX ORDER — DEXTROMETHORPHAN/PSEUDOEPHED 2.5-7.5/.8
1.2 DROPS ORAL
Status: DISCONTINUED | OUTPATIENT
Start: 2021-07-16 | End: 2021-07-16 | Stop reason: HOSPADM

## 2021-07-16 RX ORDER — PROPOFOL 10 MG/ML
INJECTION, EMULSION INTRAVENOUS AS NEEDED
Status: DISCONTINUED | OUTPATIENT
Start: 2021-07-16 | End: 2021-07-16 | Stop reason: HOSPADM

## 2021-07-16 RX ORDER — SODIUM CHLORIDE 9 MG/ML
50 INJECTION, SOLUTION INTRAVENOUS CONTINUOUS
Status: DISCONTINUED | OUTPATIENT
Start: 2021-07-16 | End: 2021-07-16 | Stop reason: HOSPADM

## 2021-07-16 RX ORDER — LIDOCAINE HYDROCHLORIDE 20 MG/ML
INJECTION, SOLUTION EPIDURAL; INFILTRATION; INTRACAUDAL; PERINEURAL AS NEEDED
Status: DISCONTINUED | OUTPATIENT
Start: 2021-07-16 | End: 2021-07-16 | Stop reason: HOSPADM

## 2021-07-16 RX ORDER — SODIUM CHLORIDE 0.9 % (FLUSH) 0.9 %
5-40 SYRINGE (ML) INJECTION EVERY 8 HOURS
Status: DISCONTINUED | OUTPATIENT
Start: 2021-07-16 | End: 2021-07-16 | Stop reason: HOSPADM

## 2021-07-16 RX ORDER — SODIUM CHLORIDE 0.9 % (FLUSH) 0.9 %
5-40 SYRINGE (ML) INJECTION AS NEEDED
Status: DISCONTINUED | OUTPATIENT
Start: 2021-07-16 | End: 2021-07-16 | Stop reason: HOSPADM

## 2021-07-16 RX ADMIN — LIDOCAINE HYDROCHLORIDE 40 MG: 20 INJECTION, SOLUTION EPIDURAL; INFILTRATION; INTRACAUDAL; PERINEURAL at 09:05

## 2021-07-16 RX ADMIN — PROPOFOL 100 MG: 10 INJECTION, EMULSION INTRAVENOUS at 09:05

## 2021-07-16 RX ADMIN — PROPOFOL 50 MG: 10 INJECTION, EMULSION INTRAVENOUS at 09:16

## 2021-07-16 RX ADMIN — PROPOFOL 50 MG: 10 INJECTION, EMULSION INTRAVENOUS at 09:10

## 2021-07-16 RX ADMIN — SODIUM CHLORIDE: 900 INJECTION, SOLUTION INTRAVENOUS at 09:06

## 2021-07-16 NOTE — DISCHARGE INSTRUCTIONS
Ronel Price  [id number]  1950    COLON DISCHARGE INSTRUCTIONS  Discomfort:  Redness at IV site- apply warm compress to area; if redness or soreness persist- contact your physician  There may be a slight amount of blood passed from the rectum  Gaseous discomfort- walking, belching will help relieve any discomfort  You may not operate a vehicle for 12 hours  You may not engage in an occupation involving machinery or appliances for rest of today  You may not drink alcoholic beverages for at least 12 hours  Avoid making any critical decisions for at least 24 hour  DIET:   Regular diet. - however -  remember your colon is empty and a heavy meal will produce gas. Avoid these foods:  vegetables, fried / greasy foods, carbonated drinks for today. MEDICATIONS:        Regarding Aspirin or Nonsteroidal medications, please see below. ACTIVITY:  You may resume your normal daily activities it is recommended that you spend the remainder of the day resting -  avoid any strenuous activity. CALL M.D. ANY SIGN OF:  Increasing pain, nausea, vomiting  Abdominal distension (swelling)  New increased bleeding (oral or rectal)  Fever (chills)  Pain in chest area  Bloody discharge from nose or mouth  Shortness of breath     Tylenol as needed for pain.       Follow-up Instructions:   Call Dr. Olya Stanley for results of procedure / biopsy in 4-5 days at telephone #  [phone]              Repeat Colonoscopy in 1 year

## 2021-07-16 NOTE — PERIOP NOTES
Anesthesia reports 200mg Propofol, 40mg Lidocaine and 500mL NS given during procedure. Received report from anesthesia staff on vital signs and status of patient. Endoscope was pre-cleaned at the bedside immediately following procedure by ANALI CARDONA

## 2021-07-16 NOTE — ANESTHESIA PREPROCEDURE EVALUATION
Anesthetic History   No history of anesthetic complications            Review of Systems / Medical History  Patient summary reviewed, nursing notes reviewed and pertinent labs reviewed    Pulmonary                Comments: Former smoker   Neuro/Psych       CVA: no residual symptoms  TIA     Cardiovascular    Hypertension: well controlled              Exercise tolerance: >4 METS     GI/Hepatic/Renal     GERD: well controlled          Comments: H/O Colon Polyps Endo/Other  Within defined limits           Other Findings              Physical Exam    Airway  Mallampati: II  TM Distance: > 6 cm  Neck ROM: normal range of motion   Mouth opening: Normal     Cardiovascular  Regular rate and rhythm,  S1 and S2 normal,  no murmur, click, rub, or gallop             Dental      Comments: Several missing teeth, none loose.    Pulmonary  Breath sounds clear to auscultation               Abdominal  GI exam deferred       Other Findings            Anesthetic Plan    ASA: 3  Anesthesia type: general and total IV anesthesia          Induction: Intravenous  Anesthetic plan and risks discussed with: Patient      Propofol MAC

## 2021-07-16 NOTE — ROUTINE PROCESS
Moreterajoan uMnroe  1950  [id number]    Situation:  Verbal report received from: Sadi Oliver  Procedure: Procedure(s):  COLONOSCOPY  ENDOSCOPIC POLYPECTOMY    Background:    Preoperative diagnosis: PERSONAL HISTORY OF COLONIC POLYPS  Postoperative diagnosis: Diverticulosis, Polyps, and Internal Hemorrhoids    :  Dr. Tyler Dominguez  Assistant(s): Endoscopy Technician-1: Natalya Bond  Endoscopy RN-1: Brian SNYDER    Specimens:   ID Type Source Tests Collected by Time Destination   1 : Transverse Colon Polyp Preservative Colon, Transverse  Kofi Reddy MD 7/16/2021 3468 Pathology   2 : Hepatic Flexure Polyps Preservative   Kofi Reddy MD 7/16/2021 0919 Pathology   3 : Descending Colon Polyp Preservative Colon, Descending  Kofi Reddy MD 7/16/2021 4009 Pathology     H. Pylori  no    Assessment:  Intra-procedure medications     Anesthesia gave intra-procedure sedation and medications, see anesthesia flow sheet yes    Intravenous fluids: NS@ KVO     Vital signs stable     Abdominal assessment: round and soft     Recommendation:  Discharge patient per MD order. Family   Permission to share finding with family or friend yes    Endoscopy discharge instructions have been reviewed and given to patient. The patient verbalized understanding and acceptance of instructions. Dr. Tyler Dominguez discussed with spouse procedure findings and next steps.

## 2021-07-16 NOTE — H&P
Gastroenterology Outpatient History and Physical    Patient: Sebastian Padilla    Physician: Geovanna Payne MD    Vital Signs: Blood pressure (!) 142/76, pulse (!) 57, temperature 97.5 °F (36.4 °C), resp. rate 17, height 5' 4\" (1.626 m), weight 78.2 kg (172 lb 6.4 oz), SpO2 97 %. Allergies: Allergies   Allergen Reactions    Codeine Unknown (comments)       Chief Complaint: H/O Polyps    History of Present Illness: 79 yo WM for personal h/o multiple colon polyps. Justification for Procedure: above    History:  Past Medical History:   Diagnosis Date    GERD (gastroesophageal reflux disease)     Hypertension     Ill-defined condition 2018    factor V     Stroke [facility])       Past Surgical History:   Procedure Laterality Date    COLONOSCOPY N/A 5/11/2018    COLONOSCOPY performed by Geovanna Payne MD at [facility] ENDOSCOPY    COLONOSCOPY Left 10/12/2020    COLONOSCOPY    :- performed by Conner Houston MD at [facility] ENDOSCOPY    HX MOHS PROCEDURES      left elbow,right knee,torn ligaments    HX OTHER SURGICAL      colonoscopy/polyp hx    NE APPENDECTOMY        Social History     Socioeconomic History    Marital status:      Spouse name: Not on file    Number of children: Not on file    Years of education: Not on file    Highest education level: Not on file   Tobacco Use    Smoking status: Former Smoker    Smokeless tobacco: Never Used    Tobacco comment: quit 20 years    Substance and Sexual Activity    Alcohol use: Yes     Alcohol/week: 1.0 standard drinks     Types: 1 Cans of beer per week     Comment: twice a week    Drug use: Not Currently     Social Determinants of Health     Financial Resource Strain:     Difficulty of Paying Living Expenses:    Food Insecurity:     Worried About Running Out of Food in the Last Year:     [address] in the Last Year:    Transportation Needs:     Lack of Transportation (Medical):      Lack of Transportation (Non-Medical):    Physical Activity:     Days of Exercise per Week:     Minutes of Exercise per Session:    Stress:     Feeling of Stress :    Social Connections:     Frequency of Communication with Friends and Family:     Frequency of Social Gatherings with Friends and Family:     Attends [employer] Services:     Active Member of Clubs or Organizations:     Attends Club or Organization Meetings:     Marital Status:     History reviewed. No pertinent family history. Medications:   Prior to Admission medications    Medication Sig Start Date End Date Taking? Authorizing Provider   atorvastatin (LIPITOR) 40 mg tablet Take 40 mg by mouth daily. Yes Provider, Historical   cholecalciferol (VITAMIN D3) (50,000 UNITS /1250 MCG) capsule Take 1 Cap by mouth every seven (7) days. 11/10/20  Yes Gutierrez Nelson MD   lisinopril (PRINIVIL, ZESTRIL) 10 mg tablet Take 10 mg by mouth daily. Yes Provider, Historical   amLODIPine (NORVASC) 2.5 mg tablet Take 2.5 mg by mouth daily. Yes Provider, Historical   omeprazole (PRILOSEC) 40 mg capsule Take 40 mg by mouth daily. Yes Provider, Historical   Cetirizine (ZYRTEC) 10 mg cap Take  by mouth daily. Yes Provider, Historical   eye lubricant combination no.1 2-0.9-1.8 % drop Apply  to eye. Yes Provider, Historical   ketotifen (ZADITOR) 0.025 % (0.035 %) ophthalmic solution Administer 1 Drop to both eyes as needed. Yes Provider, Historical   aspirin 81 mg tablet Take 81 mg by mouth. Yes Other, MD Dariusz       Physical Exam:   General: alert, no distress   HEENT: Head: Normocephalic, no lesions, without obvious abnormality.    Heart: regular rate and rhythm, S1, S2 normal, no murmur, click, rub or gallop   Lungs: chest clear, no wheezing, rales, normal symmetric air entry   Abdominal: soft, NT/ND+ BS   Neurological: Grossly normal   Extremities: extremities normal, atraumatic, no cyanosis or edema     Findings/Diagnosis: H/O Colon Polyps    Plan of Care/Planned Procedure: Colonoscopy

## 2021-07-16 NOTE — PROCEDURES
Colonoscopy Procedure Note    Indications:   Personal history of colon polyps (screening only)    Referring Physician: Maite Morrissey MD  Anesthesia/Sedation: MAC anesthesia Propofol  Endoscopist:  Dr. Maximo Michele    Procedure in Detail:  Informed consent was obtained for the procedure, including sedation. Risks of perforation, hemorrhage, adverse drug reaction, and aspiration were discussed. The patient was placed in the left lateral decubitus position. Based on the pre-procedure assessment, including review of the patient's medical history, medications, allergies, and review of systems, he had been deemed to be an appropriate candidate for moderate sedation; he was therefore sedated with the medications listed above. The patient was monitored continuously with ECG tracing, pulse oximetry, blood pressure monitoring, and direct observations. A rectal examination was performed. The [id number] was inserted into the rectum and advanced under direct vision to the cecum, which was identified by the ileocecal valve and appendiceal orifice. The quality of the colonic preparation was adequate. A careful inspection was made as the colonoscope was withdrawn, including a retroflexed view of the rectum; findings and interventions are described below. Appropriate photodocumentation was obtained. Findings:   1. Scope advanced to the cecum. 2.  Preparation was adequate. 3.  There was a 4 mm polyp in the transverse s/p cold snare removal       (2) 4 mm sessile polyps in the hepatic flexure s/p cold snare removal       (1) 4 mm sessile polyp in descending colon s/p cold snare removal  4. Normal mucosa throughout colon. 5.  Grade 1 internal hemorrhoids. Therapies:  See above    Specimen: Specimens were collected as described above and sent to pathology. Complications: None were encountered during the procedure. EBL: < 10 ml.     Recommendations:   -Repeat Colonoscopy in 1 year    Signed By: Leonardo Renteria MD                        July 16, 2021

## 2021-07-16 NOTE — ANESTHESIA POSTPROCEDURE EVALUATION
Procedure(s):  COLONOSCOPY  ENDOSCOPIC POLYPECTOMY. general, total IV anesthesia    Anesthesia Post Evaluation        Patient location during evaluation: PACU  Note status: Adequate. Level of consciousness: responsive to verbal stimuli and sleepy but conscious  Pain management: satisfactory to patient  Airway patency: patent  Anesthetic complications: no  Cardiovascular status: acceptable  Respiratory status: acceptable  Hydration status: acceptable  Comments: +Post-Anesthesia Evaluation and Assessment    Patient: Antonio Bartlett MRN: [id number]  SSN: [id number]   YOB: 1950  Age: 80 y.o. Sex: male      Cardiovascular Function/Vital Signs    /74   Pulse (!) 59   Temp 36.8 °C (98.3 °F)   Resp 20   Ht 5' 4\" (1.626 m)   Wt 78.2 kg (172 lb 6.4 oz)   SpO2 95%   BMI 29.59 kg/m²     Patient is status post Procedure(s):  COLONOSCOPY  ENDOSCOPIC POLYPECTOMY. Nausea/Vomiting: Controlled. Postoperative hydration reviewed and adequate. Pain:  Pain Scale 1: Numeric (0 - 10) (07/16/21 0948)  Pain Intensity 1: 0 (07/16/21 0948)   Managed. Neurological Status: At baseline. Mental Status and Level of Consciousness: Arousable. Pulmonary Status:   O2 Device: None (Room air) (07/16/21 0948)   Adequate oxygenation and airway patent. Complications related to anesthesia: None    Post-anesthesia assessment completed. No concerns. Signed By: Mike Atkinson MD    7/16/2021  Post anesthesia nausea and vomiting:  controlled      INITIAL Post-op Vital signs:   Vitals Value Taken Time   /74 07/16/21 0948   Temp 36.8 °C (98.3 °F) 07/16/21 0936   Pulse 55 07/16/21 0951   Resp 16 07/16/21 0951   SpO2 96 % 07/16/21 0951   Vitals shown include unvalidated device data.

## 2021-07-21 ENCOUNTER — HOSPITAL ENCOUNTER (OUTPATIENT)
Dept: CT IMAGING | Age: 71
Discharge: HOME | End: 2021-07-21
Attending: UROLOGY
Payer: MEDICARE

## 2021-07-21 DIAGNOSIS — R31.0 GROSS HEMATURIA: ICD-10-CM

## 2021-07-21 LAB — CREAT BLD-MCNC: 1.2 MG/DL (ref 0.6–1.3)

## 2021-07-21 PROCEDURE — 74178 CT ABD&PLV WO CNTR FLWD CNTR: CPT

## 2021-07-21 PROCEDURE — 82565 ASSAY OF CREATININE: CPT

## 2021-07-21 PROCEDURE — 74011000636 HC RX REV CODE- 636: Performed by: UROLOGY

## 2021-07-21 RX ADMIN — IOPAMIDOL 100 ML: 755 INJECTION, SOLUTION INTRAVENOUS at 15:29

## 2023-02-13 RX ORDER — FINASTERIDE 5 MG/1
5 TABLET, FILM COATED ORAL DAILY
COMMUNITY

## 2023-02-13 RX ORDER — FUROSEMIDE 80 MG/1
TABLET ORAL DAILY
COMMUNITY
End: 2023-02-13 | Stop reason: CLARIF

## 2023-02-14 ENCOUNTER — HOSPITAL ENCOUNTER (OUTPATIENT)
Age: 73
Setting detail: OUTPATIENT SURGERY
Discharge: HOME | End: 2023-02-14
Attending: SPECIALIST | Admitting: SPECIALIST
Payer: MEDICARE

## 2023-02-14 ENCOUNTER — ANESTHESIA (OUTPATIENT)
Dept: ENDOSCOPY | Age: 73
End: 2023-02-14
Payer: MEDICARE

## 2023-02-14 ENCOUNTER — ANESTHESIA EVENT (OUTPATIENT)
Dept: ENDOSCOPY | Age: 73
End: 2023-02-14
Payer: MEDICARE

## 2023-02-14 VITALS
HEIGHT: 64 IN | RESPIRATION RATE: 21 BRPM | WEIGHT: 187.8 LBS | DIASTOLIC BLOOD PRESSURE: 73 MMHG | BODY MASS INDEX: 32.06 KG/M2 | TEMPERATURE: 98 F | OXYGEN SATURATION: 95 % | HEART RATE: 60 BPM | SYSTOLIC BLOOD PRESSURE: 139 MMHG

## 2023-02-14 PROCEDURE — 76040000019: Performed by: SPECIALIST

## 2023-02-14 PROCEDURE — 2709999900 HC NON-CHARGEABLE SUPPLY: Performed by: SPECIALIST

## 2023-02-14 PROCEDURE — 76060000031 HC ANESTHESIA FIRST 0.5 HR: Performed by: SPECIALIST

## 2023-02-14 PROCEDURE — 74011000250 HC RX REV CODE- 250: Performed by: NURSE ANESTHETIST, CERTIFIED REGISTERED

## 2023-02-14 PROCEDURE — 74011250636 HC RX REV CODE- 250/636: Performed by: NURSE ANESTHETIST, CERTIFIED REGISTERED

## 2023-02-14 PROCEDURE — 77030013992 HC SNR POLYP ENDOSC BSC -B: Performed by: SPECIALIST

## 2023-02-14 PROCEDURE — 88305 TISSUE EXAM BY PATHOLOGIST: CPT

## 2023-02-14 RX ORDER — PROPOFOL 10 MG/ML
INJECTION, EMULSION INTRAVENOUS AS NEEDED
Status: DISCONTINUED | OUTPATIENT
Start: 2023-02-14 | End: 2023-02-14 | Stop reason: HOSPADM

## 2023-02-14 RX ORDER — SODIUM CHLORIDE 0.9 % (FLUSH) 0.9 %
5-40 SYRINGE (ML) INJECTION EVERY 8 HOURS
Status: CANCELLED | OUTPATIENT
Start: 2023-02-14

## 2023-02-14 RX ORDER — SODIUM CHLORIDE 9 MG/ML
INJECTION, SOLUTION INTRAVENOUS
Status: DISCONTINUED | OUTPATIENT
Start: 2023-02-14 | End: 2023-02-14 | Stop reason: HOSPADM

## 2023-02-14 RX ORDER — SODIUM CHLORIDE 9 MG/ML
50 INJECTION, SOLUTION INTRAVENOUS CONTINUOUS
Status: CANCELLED | OUTPATIENT
Start: 2023-02-14 | End: 2023-02-14

## 2023-02-14 RX ORDER — SODIUM CHLORIDE 0.9 % (FLUSH) 0.9 %
5-40 SYRINGE (ML) INJECTION AS NEEDED
Status: CANCELLED | OUTPATIENT
Start: 2023-02-14

## 2023-02-14 RX ORDER — DEXTROMETHORPHAN/PSEUDOEPHED 2.5-7.5/.8
1.2 DROPS ORAL
Status: CANCELLED | OUTPATIENT
Start: 2023-02-14

## 2023-02-14 RX ORDER — LIDOCAINE HYDROCHLORIDE 20 MG/ML
INJECTION, SOLUTION EPIDURAL; INFILTRATION; INTRACAUDAL; PERINEURAL AS NEEDED
Status: DISCONTINUED | OUTPATIENT
Start: 2023-02-14 | End: 2023-02-14 | Stop reason: HOSPADM

## 2023-02-14 RX ADMIN — PROPOFOL 30 MG: 10 INJECTION, EMULSION INTRAVENOUS at 10:41

## 2023-02-14 RX ADMIN — PROPOFOL 30 MG: 10 INJECTION, EMULSION INTRAVENOUS at 10:35

## 2023-02-14 RX ADMIN — PROPOFOL 40 MG: 10 INJECTION, EMULSION INTRAVENOUS at 10:38

## 2023-02-14 RX ADMIN — PROPOFOL 50 MG: 10 INJECTION, EMULSION INTRAVENOUS at 10:27

## 2023-02-14 RX ADMIN — SODIUM CHLORIDE: 0.9 INJECTION, SOLUTION INTRAVENOUS at 10:02

## 2023-02-14 RX ADMIN — PROPOFOL 50 MG: 10 INJECTION, EMULSION INTRAVENOUS at 10:31

## 2023-02-14 RX ADMIN — LIDOCAINE HYDROCHLORIDE 80 MG: 20 INJECTION, SOLUTION EPIDURAL; INFILTRATION; INTRACAUDAL; PERINEURAL at 10:27

## 2023-02-14 NOTE — PERIOP NOTES
Endoscopy Case End Note:    1046:  Procedure scope was pre-cleaned, per protocol, at bedside by Eric Acuna. 1050:  Report received from anesthesia - Clemencia Grider CRNA. See anesthesia flowsheet for intra-procedure vital signs and events. 1054:  Glasses returned to patient.

## 2023-02-14 NOTE — PROCEDURES
Colonoscopy Procedure Note    Indications:   Personal history of colon polyps (screening only)    Referring Physician: Monica Bravo MD  Anesthesia/Sedation: MAC anesthesia Propofol  Endoscopist:  Dr. Carmen Baker    Procedure in Detail:  Informed consent was obtained for the procedure, including sedation. Risks of perforation, hemorrhage, adverse drug reaction, and aspiration were discussed. The patient was placed in the left lateral decubitus position. Based on the pre-procedure assessment, including review of the patient's medical history, medications, allergies, and review of systems, he had been deemed to be an appropriate candidate for moderate sedation; he was therefore sedated with the medications listed above. The patient was monitored continuously with ECG tracing, pulse oximetry, blood pressure monitoring, and direct observations. A rectal examination was performed. The [id number] was inserted into the rectum and advanced under direct vision to the cecum, which was identified by the ileocecal valve and appendiceal orifice. The quality of the colonic preparation was adequate. A careful inspection was made as the colonoscope was withdrawn, including a retroflexed view of the rectum; findings and interventions are described below. Appropriate photodocumentation was obtained. Findings:    Scope advanced to the cecum. Preparation was adequate. There was a sessile 5 mm polyp in the area of the splenic flexure s/p cold snare removal.   Scattered diverticulosis. Therapies:  see above    Specimen: Specimens were collected as described above and sent to pathology. Complications: None were encountered during the procedure. EBL: < 10 ml.     Recommendations:   -repeat colonoscopy in 3 years    Signed By: Hood Cooper MD                        February 14, 2023

## 2023-02-14 NOTE — ANESTHESIA POSTPROCEDURE EVALUATION
Procedure(s):  COLONOSCOPY  ENDOSCOPIC POLYPECTOMY. general, total IV anesthesia    Anesthesia Post Evaluation        Patient location during evaluation: PACU  Note status: Adequate. Level of consciousness: responsive to verbal stimuli and sleepy but conscious  Pain management: satisfactory to patient  Airway patency: patent  Anesthetic complications: no  Cardiovascular status: acceptable  Respiratory status: acceptable  Hydration status: acceptable  Comments: +Post-Anesthesia Evaluation and Assessment    Patient: Jami Merlos MRN: [id number]  SSN: [id number]   YOB: 1950  Age: 71 y.o. Sex: male      Cardiovascular Function/Vital Signs    /73   Pulse 60   Temp 36.7 °C (98 °F)   Resp 21   Ht 5' 4\" (1.626 m)   Wt 85.2 kg (187 lb 12.8 oz)   SpO2 95%   BMI 32.24 kg/m²     Patient is status post Procedure(s):  COLONOSCOPY  ENDOSCOPIC POLYPECTOMY. Nausea/Vomiting: Controlled. Postoperative hydration reviewed and adequate. Pain:  Pain Scale 1: Numeric (0 - 10) (02/14/23 1109)  Pain Intensity 1: 0 (02/14/23 1109)   Managed. Neurological Status: At baseline. Mental Status and Level of Consciousness: Arousable. Pulmonary Status:   O2 Device: None (Room air) (02/14/23 1109)   Adequate oxygenation and airway patent. Complications related to anesthesia: None    Post-anesthesia assessment completed. No concerns. Signed By: Mike Mejía MD    2/14/2023  Post anesthesia nausea and vomiting:  controlled      INITIAL Post-op Vital signs:   Vitals Value Taken Time   /73 02/14/23 1109   Temp 36.7 °C (98 °F) 02/14/23 1103   Pulse 59 02/14/23 1112   Resp 21 02/14/23 1112   SpO2 94 % 02/14/23 1112   Vitals shown include unvalidated device data.

## 2023-02-14 NOTE — H&P
Gastroenterology Outpatient History and Physical    Patient: Robin Melara    Physician: Jennie Lisa MD    Vital Signs: Blood pressure (!) 171/71, pulse 67, temperature 98 °F (36.7 °C), resp. rate 16, height 5' 4\" (1.626 m), weight 85.2 kg (187 lb 12.8 oz), SpO2 96 %. Allergies: Allergies   Allergen Reactions    Codeine Unknown (comments)       Chief Complaint: Personal h/o multiple colon polyps. History of Present Illness: above    Justification for Procedure: above    History:  Past Medical History:   Diagnosis Date    Cancer ([facility] [address]     bladder    Coagulation disorder (HCC)     Factor V Leiden    GERD (gastroesophageal reflux disease)     Hypertension     Ill-defined condition 2018    factor V     Stroke [facility])       Past Surgical History:   Procedure Laterality Date    COLONOSCOPY N/A 2018    COLONOSCOPY performed by Jennie Lisa MD at [facility] ENDOSCOPY    COLONOSCOPY Left 10/12/2020    COLONOSCOPY    :- performed by Ivette Smith MD at [facility] ENDOSCOPY    COLONOSCOPY N/A 2021    COLONOSCOPY performed by Hobart Nissen, MD at [facility] ENDOSCOPY    HX MOHS PROCEDURES      left elbow,right knee,torn ligaments    HX OTHER SURGICAL      colonoscopy/polyp hx    HX UROLOGICAL      bladder cancer removed    [facility] APPENDECTOMY        Social History     Socioeconomic History    Marital status:    Tobacco Use    Smoking status: Former     Types: Cigarettes     Quit date:      Years since quittin.1    Smokeless tobacco: Never    Tobacco comments:     quit 20 years    Vaping Use    Vaping Use: Never used   Substance and Sexual Activity    Alcohol use: Yes     Alcohol/week: 1.0 standard drink     Types: 1 Cans of beer per week     Comment: twice a week    Drug use: Not Currently    History reviewed. No pertinent family history. Medications:   Prior to Admission medications    Medication Sig Start Date End Date Taking?  Authorizing Provider   finasteride (PROSCAR) 5 mg tablet Take 5 mg by mouth daily. Yes Provider, Historical   atorvastatin (LIPITOR) 40 mg tablet Take 40 mg by mouth daily. Yes Provider, Historical   cholecalciferol (VITAMIN D3) (50,000 UNITS /1250 MCG) capsule Take 1 Cap by mouth every seven (7) days. 11/10/20  Yes Natalee Quintana MD   lisinopril (PRINIVIL, ZESTRIL) 10 mg tablet Take 10 mg by mouth daily. Yes Provider, Historical   amLODIPine (NORVASC) 2.5 mg tablet Take 2.5 mg by mouth daily. Yes Provider, Historical   omeprazole (PRILOSEC) 40 mg capsule Take 40 mg by mouth daily. Yes Provider, Historical   Cetirizine 10 mg cap Take  by mouth daily. Yes Provider, Historical   eye lubricant combination no.1 2-0.9-1.8 % drop Apply  to eye as needed. Yes Provider, Historical   ketotifen (ZADITOR) 0.025 % (0.035 %) ophthalmic solution Administer 1 Drop to both eyes as needed. Yes Provider, Historical   aspirin 81 mg tablet Take 81 mg by mouth. Yes Other, MD Dariusz       Physical Exam:   General: alert, no distress   HEENT: Head: Normocephalic, no lesions, without obvious abnormality.    Heart: regular rate and rhythm, S1, S2 normal, no murmur, click, rub or gallop   Lungs: chest clear, no wheezing, rales, normal symmetric air entry   Abdominal: soft, NT/ND+ BS   Neurological: Grossly normal   Extremities: extremities normal, atraumatic, no cyanosis or edema     Findings/Diagnosis: Personal h/o colon polyps    Plan of Care/Planned Procedure: Colonoscopy

## 2023-02-14 NOTE — DISCHARGE INSTRUCTIONS
Cortez Doty  [id number]  1950    COLON DISCHARGE INSTRUCTIONS  Discomfort:  Redness at IV site- apply warm compress to area; if redness or soreness persist- contact your physician  There may be a slight amount of blood passed from the rectum  Gaseous discomfort- walking, belching will help relieve any discomfort  You may not operate a vehicle for 12 hours  You may not engage in an occupation involving machinery or appliances for rest of today  You may not drink alcoholic beverages for at least 12 hours  Avoid making any critical decisions for at least 24 hour  DIET:   Regular diet. - however -  remember your colon is empty and a heavy meal will produce gas. Avoid these foods:  vegetables, fried / greasy foods, carbonated drinks for today. MEDICATIONS:        Regarding Aspirin or Nonsteroidal medications, please see below. ACTIVITY:  You may resume your normal daily activities it is recommended that you spend the remainder of the day resting -  avoid any strenuous activity. CALL M.D. ANY SIGN OF:  Increasing pain, nausea, vomiting  Abdominal distension (swelling)  New increased bleeding (oral or rectal)  Fever (chills)  Pain in chest area  Bloody discharge from nose or mouth  Shortness of breath  Tylenol as needed for pain.       Follow-up Instructions:   Call Dr. Christine Gallego for results of procedure / biopsy in 4-5 dayselephone #  [phone]  Patient Education on Sedation / Analgesia Administered for Procedure      For 24 hours after general anesthesia or intravenous analgesia / sedation:  Have someone responsible help you with your care  Limit your activities  Do not drive and operate hazardous machinery  Do not make important personal, legal or business decisions  Do not drink alcoholic beverages  If you have not urinated within 8 hours after discharge, please contact your physician  Resume your medications unless otherwise instructed    For 24 hours after general anesthesia or intravenous analgesia / sedation  you may experience:  Drowsiness, dizziness, sleepiness, or confusion  Difficulty remembering or delayed reaction times  Difficulty with your balance, especially while walking, move slowly and carefully, do not make sudden position changes  Difficulty focusing or blurred vision    You may not be aware of slight changes in your behavior and/or your reaction time because of the medication used during and after your procedure.     Report the following to your physician:  Excessive pain, swelling, redness or odor of or around the surgical area  Temperature over 100.5  Nausea and vomiting lasting longer than 4 hours or if unable to take medications  Any signs of decreased circulation or nerve impairment to extremity: change in color, persistent numbness, tingling, coldness or increase pain  Any questions or concerns    IF YOU REPORT TO AN EMERGENCY ROOM, DOCTOR'S OFFICE OR HOSPITAL WITHIN 24 HOURS AFTER YOUR PROCEDURE, BRING THIS SHEET AND YOUR AFTER VISIT SUMMARY WITH YOU AND GIVE IT TO THE PHYSICIAN OR NURSE ATTENDING YOU. <<-----Click here for Discharge Medication Review

## 2023-02-14 NOTE — ROUTINE PROCESS
Asia Allen  1950  [id number]    Situation:  Verbal report received from: Jodi Nowak  Procedure: Procedure(s):  COLONOSCOPY  ENDOSCOPIC POLYPECTOMY    Background:    Preoperative diagnosis: PERSONAL HX COLON POLYPS  Postoperative diagnosis: COLON: Polyp, Diverticulosis    :  Dr. Tia Castle  Assistant(s): Endoscopy Technician-1: Lili Iqbal  Endoscopy RN-1: Mirtha Alvarenga RN    Specimens:   ID Type Source Tests Collected by Time Destination   1 : Splenic Flexure Polyp Preservative Splenic Flexure  Kena Bain MD 2/14/2023 1042 Pathology     H. Pylori  no    Assessment:  Anesthesia gave intra-procedure sedation and medications, see anesthesia flow sheet yes    Intravenous fluids: NS@ [facility]     Vital signs stable yes    Abdominal assessment: round and soft yes    Recommendation:

## 2023-08-01 NOTE — ROUTINE PROCESS
Monique Varner 
1950 
[id number] Situation: 
Verbal report received from: [facility] Procedure: Procedure(s): ESOPHAGOGASTRODUODENOSCOPY (EGD), COLONOSCOPY 
COLONOSCOPY    :- 
ESOPHAGOGASTRODUODENAL (EGD) BIOPSY ENDOSCOPIC POLYPECTOMY RESOLUTION CLIP INJECTION Background: 
 
Preoperative diagnosis: ABNORMAL FINDINGS OF DX IMAGING OF PRT DIGESTIVE TRACT, DYSPEPSIA, GERD, HEARTBURN Postoperative diagnosis: 1.small hiatal hernia 2.irreqular Z line 3. Right sided colon polyps (cecal & ascending colon polyps x 5) 4. transverse colon polyp / inject / heat 5.hepatic flexure / heat 6. left sided colon polyps :  Dr. Winslow Bulpitt Assistant(s): Endoscopy RN-1: Marisol Hodges Endoscopy RN-2: Linda Hilton RN Specimens:  
ID Type Source Tests Collected by Time Destination 1 : cardia bx Preservative Gastric  Monique Plata MD 10/12/2020 1340 Pathology 2 : GE junction bx Preservative Genital  Monique Plata MD 10/12/2020 1342 Pathology 3 : right sided colon polyps Preservative Colon, Right  Monique Plata MD 10/12/2020 1358 Pathology 4 : transverse colon polyp Preservative Colon, Transverse  Monique Plata MD 10/12/2020 1410 Pathology 5 : hepatic flexure Preservative Hepatic Flexure  Monique Plata MD 10/12/2020 1418 Pathology 6 : left sided colon polyps Preservative Colon, Left  Monique Plata MD 10/12/2020 1430 Pathology H. Pylori  no Assessment: 
Intra-procedure medications Anesthesia gave intra-procedure sedation and medications, see anesthesia flow sheet yes Intravenous fluids: NS@ [facility] Vital signs stable yes Abdominal assessment: round and soft  Yes Recommendation: 
Discharge patient per MD order yes . Return to floor na Family or Friend  fam 
Permission to share finding with family or friend yes 42.6

## 2024-10-01 ENCOUNTER — HOSPITAL ENCOUNTER (OUTPATIENT)
Facility: HOSPITAL | Age: 74
Discharge: HOME | End: 2024-10-04
Payer: MEDICARE

## 2024-10-01 DIAGNOSIS — M79.661 RIGHT CALF PAIN: ICD-10-CM

## 2024-10-01 PROCEDURE — 93971 EXTREMITY STUDY: CPT

## 2024-10-30 ENCOUNTER — TRANSCRIBE ORDERS (OUTPATIENT)
Facility: HOSPITAL | Age: 74
End: 2024-10-30

## 2024-10-30 ENCOUNTER — HOSPITAL ENCOUNTER (OUTPATIENT)
Facility: HOSPITAL | Age: 74
Discharge: HOME | End: 2024-11-02
Payer: MEDICARE

## 2024-10-30 DIAGNOSIS — N64.4 BREAST PAIN: Primary | ICD-10-CM

## 2024-10-30 DIAGNOSIS — N64.4 BREAST PAIN: ICD-10-CM

## 2024-10-30 PROCEDURE — G0279 TOMOSYNTHESIS, MAMMO: HCPCS

## 2024-10-30 PROCEDURE — 77066 DX MAMMO INCL CAD BI: CPT

## (undated) DEVICE — TOWEL 4 PLY TISS 19X30 SUE WHT

## (undated) DEVICE — NON-REM POLYHESIVE PATIENT RETURN ELECTRODE: Brand: VALLEYLAB

## (undated) DEVICE — SET ADMIN 16ML TBNG L100IN 2 Y INJ SITE IV PIGGY BK DISP

## (undated) DEVICE — SOLIDIFIER MEDC 1200ML -- CONVERT TO 356117

## (undated) DEVICE — BASIN EMSIS 16OZ GRAPHITE PLAS KID SHP MOLD GRAD FOR ORAL

## (undated) DEVICE — SNARE ENDOSCP M L240CM W27MM SHTH DIA2.4MM CHN 2.8MM OVL

## (undated) DEVICE — ELECTRODE,RADIOTRANSLUCENT,FOAM,5PK: Brand: MEDLINE

## (undated) DEVICE — NEEDLE HYPO 18GA L1.5IN PNK S STL HUB POLYPR SHLD REG BVL

## (undated) DEVICE — Z DISCONTINUED PER MEDLINE LINE GAS SAMPLING O2/CO2 LNG AD 13 FT NSL W/ TBNG FILTERLINE

## (undated) DEVICE — TUBING HYDR IRR --

## (undated) DEVICE — SYR 3ML LL TIP 1/10ML GRAD --

## (undated) DEVICE — TRAP,MUCUS SPECIMEN, 80CC: Brand: MEDLINE

## (undated) DEVICE — KENDALL RADIOLUCENT FOAM MONITORING ELECTRODE RECTANGULAR SHAPE: Brand: KENDALL

## (undated) DEVICE — 1200 GUARD II KIT W/5MM TUBE W/O VAC TUBE: Brand: GUARDIAN

## (undated) DEVICE — CATH IV AUTOGRD BC PNK 20GA 25 -- INSYTE

## (undated) DEVICE — Device

## (undated) DEVICE — CONTAINER SPEC 20 ML LID NEUT BUFF FORMALIN 10 % POLYPR STS

## (undated) DEVICE — SNARE POLYP SM AD W13MMXL240CM SHTH DIA2.4MM HEX STIFF

## (undated) DEVICE — WORKING LENGTH 235CM, WORKING CHANNEL 2.8MM: Brand: RESOLUTION 360 CLIP

## (undated) DEVICE — SYRINGE MED 10ML LUERLOCK TIP W/O SFTY DISP

## (undated) DEVICE — NEONATAL-ADULT SPO2 SENSOR: Brand: NELLCOR

## (undated) DEVICE — CUFF ADULT 1 PC 1 VINYL DISP --

## (undated) DEVICE — SYR 10ML LUER LOK 1/5ML GRAD --

## (undated) DEVICE — STRAINER URIN CALC RNL MSH -- CONVERT TO ITEM 357634

## (undated) DEVICE — SOLIDIFIER FLD 2OZ 1500CC N DISINF IN BTL DISP SAFESORB

## (undated) DEVICE — Device: Brand: SINGLE USE INJECTOR NM600/610

## (undated) DEVICE — TRAP SUC MUCOUS 70ML -- MEDICHOICE MEDLINE

## (undated) DEVICE — GEL SUBMUCOSAL LIFT AGNT -- ORISE

## (undated) DEVICE — REM POLYHESIVE ADULT PATIENT RETURN ELECTRODE: Brand: VALLEYLAB

## (undated) DEVICE — HYPODERMIC SAFETY NEEDLE: Brand: MAGELLAN

## (undated) DEVICE — FORCEPS BX L240CM JAW DIA2.8MM L CAP W/ NDL MIC MESH TOOTH

## (undated) DEVICE — SET ADMIN 16ML TBNG L100IN 2 Y INJ SITE IV PIGGY BK DISP (ORDER IN MULIPLES OF 48)

## (undated) DEVICE — TRAP SURG QUAD PARABOLA SLOT DSGN SFTY SCRN TRAPEASE